# Patient Record
Sex: FEMALE | Race: WHITE | Employment: UNEMPLOYED | URBAN - METROPOLITAN AREA
[De-identification: names, ages, dates, MRNs, and addresses within clinical notes are randomized per-mention and may not be internally consistent; named-entity substitution may affect disease eponyms.]

---

## 2018-03-18 ENCOUNTER — HOSPITAL ENCOUNTER (EMERGENCY)
Age: 28
Discharge: HOME | End: 2018-03-18

## 2018-03-18 VITALS
WEIGHT: 147.31 LBS | OXYGEN SATURATION: 99 % | RESPIRATION RATE: 12 BRPM | DIASTOLIC BLOOD PRESSURE: 69 MMHG | HEART RATE: 100 BPM | BODY MASS INDEX: 27.11 KG/M2 | TEMPERATURE: 98.5 F | HEIGHT: 62 IN | SYSTOLIC BLOOD PRESSURE: 132 MMHG

## 2018-03-18 DIAGNOSIS — O21.9 VOMITING OF PREGNANCY, ANTEPARTUM: Primary | ICD-10-CM

## 2018-03-18 LAB — PREGNANCY TEST URINE, POC: POSITIVE

## 2018-03-18 PROCEDURE — 99284 EMERGENCY DEPT VISIT MOD MDM: CPT

## 2018-03-18 RX ORDER — LANOLIN ALCOHOL/MO/W.PET/CERES
25 CREAM (GRAM) TOPICAL 3 TIMES DAILY PRN
Qty: 30 TABLET | Refills: 3 | Status: ON HOLD | OUTPATIENT
Start: 2018-03-18 | End: 2018-08-23 | Stop reason: HOSPADM

## 2018-03-18 RX ORDER — SWAB
1 SWAB, NON-MEDICATED MISCELLANEOUS DAILY
Qty: 30 TABLET | Refills: 0 | Status: SHIPPED | OUTPATIENT
Start: 2018-03-18 | End: 2022-06-13 | Stop reason: HOSPADM

## 2018-03-18 NOTE — ED PROVIDER NOTES
R-3Print           Electronically signed by Rosa Elena Maldonado NP   DD: 3/18/18  **This report was transcribed using voice recognition software. Every effort was made to ensure accuracy; however, inadvertent computerized transcription errors may be present.   END OF ED PROVIDER NOTE     Rosa Elena Maldonado NP  03/18/18 2104

## 2018-08-17 ENCOUNTER — HOSPITAL ENCOUNTER (OUTPATIENT)
Age: 28
Discharge: HOME | DRG: 540 | End: 2018-08-17
Attending: OBSTETRICS & GYNECOLOGY | Admitting: OBSTETRICS & GYNECOLOGY
Payer: MEDICAID

## 2018-08-17 ENCOUNTER — APPOINTMENT (OUTPATIENT)
Dept: LABOR AND DELIVERY | Age: 28
DRG: 540 | End: 2018-08-17
Payer: MEDICAID

## 2018-08-17 VITALS
HEART RATE: 89 BPM | TEMPERATURE: 98.3 F | SYSTOLIC BLOOD PRESSURE: 139 MMHG | DIASTOLIC BLOOD PRESSURE: 73 MMHG | RESPIRATION RATE: 16 BRPM

## 2018-08-17 PROCEDURE — 6370000000 HC RX 637 (ALT 250 FOR IP): Performed by: OBSTETRICS & GYNECOLOGY

## 2018-08-17 PROCEDURE — 59025 FETAL NON-STRESS TEST: CPT

## 2018-08-17 RX ORDER — METRONIDAZOLE 500 MG/1
2000 TABLET ORAL ONCE
Status: COMPLETED | OUTPATIENT
Start: 2018-08-17 | End: 2018-08-17

## 2018-08-17 RX ADMIN — METRONIDAZOLE 2000 MG: 500 TABLET ORAL at 16:05

## 2018-08-20 ENCOUNTER — HOSPITAL ENCOUNTER (INPATIENT)
Age: 28
LOS: 3 days | Discharge: HOME | DRG: 540 | End: 2018-08-23
Attending: OBSTETRICS & GYNECOLOGY | Admitting: OBSTETRICS & GYNECOLOGY
Payer: MEDICAID

## 2018-08-20 ENCOUNTER — ANESTHESIA EVENT (OUTPATIENT)
Dept: LABOR AND DELIVERY | Age: 28
DRG: 540 | End: 2018-08-20
Payer: MEDICAID

## 2018-08-20 ENCOUNTER — ANESTHESIA (OUTPATIENT)
Dept: LABOR AND DELIVERY | Age: 28
DRG: 540 | End: 2018-08-20
Payer: MEDICAID

## 2018-08-20 DIAGNOSIS — G89.18 POSTOPERATIVE PAIN: Primary | ICD-10-CM

## 2018-08-20 PROBLEM — Z34.93 NORMAL PREGNANCY IN THIRD TRIMESTER: Status: ACTIVE | Noted: 2018-08-20

## 2018-08-20 LAB
ABO/RH: NORMAL
AMPHETAMINE SCREEN, URINE: NOT DETECTED
AMPHETAMINE SCREEN, URINE: NOT DETECTED
ANTIBODY SCREEN: NORMAL
BARBITURATE SCREEN URINE: NOT DETECTED
BARBITURATE SCREEN URINE: NOT DETECTED
BENZODIAZEPINE SCREEN, URINE: NOT DETECTED
BENZODIAZEPINE SCREEN, URINE: NOT DETECTED
CANNABINOID SCREEN URINE: NOT DETECTED
CANNABINOID SCREEN URINE: NOT DETECTED
COCAINE METABOLITE SCREEN URINE: NOT DETECTED
COCAINE METABOLITE SCREEN URINE: POSITIVE
HCT VFR BLD CALC: 37.3 % (ref 34–48)
HEMOGLOBIN: 12.7 G/DL (ref 11.5–15.5)
MCH RBC QN AUTO: 29.2 PG (ref 26–35)
MCHC RBC AUTO-ENTMCNC: 34 % (ref 32–34.5)
MCV RBC AUTO: 85.7 FL (ref 80–99.9)
METHADONE SCREEN, URINE: NOT DETECTED
METHADONE SCREEN, URINE: NOT DETECTED
OPIATE SCREEN URINE: NOT DETECTED
OPIATE SCREEN URINE: NOT DETECTED
PDW BLD-RTO: 12.6 FL (ref 11.5–15)
PHENCYCLIDINE SCREEN URINE: NOT DETECTED
PHENCYCLIDINE SCREEN URINE: NOT DETECTED
PLATELET # BLD: 198 E9/L (ref 130–450)
PMV BLD AUTO: 12.8 FL (ref 7–12)
PROPOXYPHENE SCREEN: NOT DETECTED
PROPOXYPHENE SCREEN: NOT DETECTED
RBC # BLD: 4.35 E12/L (ref 3.5–5.5)
WBC # BLD: 10.1 E9/L (ref 4.5–11.5)

## 2018-08-20 PROCEDURE — 3700000025 ANESTHESIA EPIDURAL BLOCK: Performed by: ANESTHESIOLOGY

## 2018-08-20 PROCEDURE — 2500000003 HC RX 250 WO HCPCS: Performed by: ANESTHESIOLOGY

## 2018-08-20 PROCEDURE — 86901 BLOOD TYPING SEROLOGIC RH(D): CPT

## 2018-08-20 PROCEDURE — 85027 COMPLETE CBC AUTOMATED: CPT

## 2018-08-20 PROCEDURE — 6360000002 HC RX W HCPCS

## 2018-08-20 PROCEDURE — 6360000002 HC RX W HCPCS: Performed by: OBSTETRICS & GYNECOLOGY

## 2018-08-20 PROCEDURE — 1220000001 HC SEMI PRIVATE L&D R&B

## 2018-08-20 PROCEDURE — 2580000003 HC RX 258: Performed by: OBSTETRICS & GYNECOLOGY

## 2018-08-20 PROCEDURE — 86900 BLOOD TYPING SEROLOGIC ABO: CPT

## 2018-08-20 PROCEDURE — 80307 DRUG TEST PRSMV CHEM ANLYZR: CPT

## 2018-08-20 PROCEDURE — G0480 DRUG TEST DEF 1-7 CLASSES: HCPCS

## 2018-08-20 PROCEDURE — 36415 COLL VENOUS BLD VENIPUNCTURE: CPT

## 2018-08-20 PROCEDURE — 86850 RBC ANTIBODY SCREEN: CPT

## 2018-08-20 RX ORDER — ONDANSETRON 2 MG/ML
4 INJECTION INTRAMUSCULAR; INTRAVENOUS EVERY 6 HOURS PRN
Status: DISCONTINUED | OUTPATIENT
Start: 2018-08-20 | End: 2018-08-21 | Stop reason: SDUPTHER

## 2018-08-20 RX ORDER — SODIUM CHLORIDE, SODIUM LACTATE, POTASSIUM CHLORIDE, CALCIUM CHLORIDE 600; 310; 30; 20 MG/100ML; MG/100ML; MG/100ML; MG/100ML
500 INJECTION, SOLUTION INTRAVENOUS
Status: ACTIVE | OUTPATIENT
Start: 2018-08-20 | End: 2018-08-20

## 2018-08-20 RX ORDER — SODIUM CHLORIDE, SODIUM LACTATE, POTASSIUM CHLORIDE, CALCIUM CHLORIDE 600; 310; 30; 20 MG/100ML; MG/100ML; MG/100ML; MG/100ML
INJECTION, SOLUTION INTRAVENOUS CONTINUOUS
Status: DISCONTINUED | OUTPATIENT
Start: 2018-08-20 | End: 2018-08-21 | Stop reason: SDUPTHER

## 2018-08-20 RX ORDER — SODIUM CHLORIDE 0.9 % (FLUSH) 0.9 %
10 SYRINGE (ML) INJECTION PRN
Status: DISCONTINUED | OUTPATIENT
Start: 2018-08-20 | End: 2018-08-21 | Stop reason: SDUPTHER

## 2018-08-20 RX ORDER — DOCUSATE SODIUM 100 MG/1
100 CAPSULE, LIQUID FILLED ORAL 2 TIMES DAILY
Status: DISCONTINUED | OUTPATIENT
Start: 2018-08-20 | End: 2018-08-21

## 2018-08-20 RX ORDER — TERBUTALINE SULFATE 1 MG/ML
0.25 INJECTION, SOLUTION SUBCUTANEOUS PRN
Status: DISCONTINUED | OUTPATIENT
Start: 2018-08-20 | End: 2018-08-21

## 2018-08-20 RX ORDER — NALOXONE HYDROCHLORIDE 0.4 MG/ML
0.4 INJECTION, SOLUTION INTRAMUSCULAR; INTRAVENOUS; SUBCUTANEOUS PRN
Status: DISCONTINUED | OUTPATIENT
Start: 2018-08-20 | End: 2018-08-21

## 2018-08-20 RX ORDER — NALBUPHINE HCL 10 MG/ML
5 AMPUL (ML) INJECTION EVERY 4 HOURS PRN
Status: DISCONTINUED | OUTPATIENT
Start: 2018-08-20 | End: 2018-08-21

## 2018-08-20 RX ORDER — ONDANSETRON 2 MG/ML
4 INJECTION INTRAMUSCULAR; INTRAVENOUS EVERY 6 HOURS PRN
Status: DISCONTINUED | OUTPATIENT
Start: 2018-08-20 | End: 2018-08-21

## 2018-08-20 RX ORDER — EPHEDRINE SULFATE/0.9% NACL/PF 50 MG/5 ML
10 SYRINGE (ML) INTRAVENOUS EVERY 5 MIN PRN
Status: DISCONTINUED | OUTPATIENT
Start: 2018-08-20 | End: 2018-08-21

## 2018-08-20 RX ADMIN — Medication 2 MILLI-UNITS/MIN: at 16:00

## 2018-08-20 RX ADMIN — Medication 15 ML: at 21:41

## 2018-08-20 RX ADMIN — Medication 15 ML/HR: at 21:52

## 2018-08-20 RX ADMIN — SODIUM CHLORIDE, POTASSIUM CHLORIDE, SODIUM LACTATE AND CALCIUM CHLORIDE: 600; 310; 30; 20 INJECTION, SOLUTION INTRAVENOUS at 20:29

## 2018-08-20 RX ADMIN — SODIUM CHLORIDE, POTASSIUM CHLORIDE, SODIUM LACTATE AND CALCIUM CHLORIDE: 600; 310; 30; 20 INJECTION, SOLUTION INTRAVENOUS at 15:15

## 2018-08-20 RX ADMIN — SODIUM CHLORIDE, POTASSIUM CHLORIDE, SODIUM LACTATE AND CALCIUM CHLORIDE: 600; 310; 30; 20 INJECTION, SOLUTION INTRAVENOUS at 22:54

## 2018-08-20 ASSESSMENT — LIFESTYLE VARIABLES: SMOKING_STATUS: 0

## 2018-08-20 ASSESSMENT — ENCOUNTER SYMPTOMS: SHORTNESS OF BREATH: 0

## 2018-08-20 NOTE — PROGRESS NOTES
Taking over care of patient at this time, report received from previous RN. RN to bedside where patient explained tonights POC, US/TOCO adjusted, pt up to void, and cervical exam completed. Patient verbalizes understanding. Patient perceives positive fetal movement and RN notes audible fetal movement over the monitor. RN monitoring and assessing FHT/UC pattern every 15 minutes while on IV pitocin. Call light in place and family at bedside.

## 2018-08-21 VITALS — TEMPERATURE: 96.8 F | SYSTOLIC BLOOD PRESSURE: 124 MMHG | DIASTOLIC BLOOD PRESSURE: 63 MMHG | OXYGEN SATURATION: 97 %

## 2018-08-21 PROCEDURE — 2580000003 HC RX 258: Performed by: NURSE ANESTHETIST, CERTIFIED REGISTERED

## 2018-08-21 PROCEDURE — 2500000003 HC RX 250 WO HCPCS

## 2018-08-21 PROCEDURE — 6370000000 HC RX 637 (ALT 250 FOR IP)

## 2018-08-21 PROCEDURE — 1220000001 HC SEMI PRIVATE L&D R&B

## 2018-08-21 PROCEDURE — 3700000001 HC ADD 15 MINUTES (ANESTHESIA): Performed by: OBSTETRICS & GYNECOLOGY

## 2018-08-21 PROCEDURE — 7100000001 HC PACU RECOVERY - ADDTL 15 MIN: Performed by: OBSTETRICS & GYNECOLOGY

## 2018-08-21 PROCEDURE — 6360000002 HC RX W HCPCS

## 2018-08-21 PROCEDURE — 6360000002 HC RX W HCPCS: Performed by: ANESTHESIOLOGY

## 2018-08-21 PROCEDURE — 2500000003 HC RX 250 WO HCPCS: Performed by: ANESTHESIOLOGY

## 2018-08-21 PROCEDURE — 2709999900 HC NON-CHARGEABLE SUPPLY: Performed by: OBSTETRICS & GYNECOLOGY

## 2018-08-21 PROCEDURE — 3700000000 HC ANESTHESIA ATTENDED CARE: Performed by: OBSTETRICS & GYNECOLOGY

## 2018-08-21 PROCEDURE — 2580000003 HC RX 258: Performed by: OBSTETRICS & GYNECOLOGY

## 2018-08-21 PROCEDURE — 3609079900 HC CESAREAN SECTION: Performed by: OBSTETRICS & GYNECOLOGY

## 2018-08-21 PROCEDURE — 6360000002 HC RX W HCPCS: Performed by: NURSE ANESTHETIST, CERTIFIED REGISTERED

## 2018-08-21 PROCEDURE — 2500000003 HC RX 250 WO HCPCS: Performed by: NURSE ANESTHETIST, CERTIFIED REGISTERED

## 2018-08-21 PROCEDURE — 7100000000 HC PACU RECOVERY - FIRST 15 MIN: Performed by: OBSTETRICS & GYNECOLOGY

## 2018-08-21 PROCEDURE — 6360000002 HC RX W HCPCS: Performed by: OBSTETRICS & GYNECOLOGY

## 2018-08-21 RX ORDER — SODIUM CHLORIDE, SODIUM LACTATE, POTASSIUM CHLORIDE, CALCIUM CHLORIDE 600; 310; 30; 20 MG/100ML; MG/100ML; MG/100ML; MG/100ML
INJECTION, SOLUTION INTRAVENOUS CONTINUOUS
Status: DISCONTINUED | OUTPATIENT
Start: 2018-08-21 | End: 2018-08-21

## 2018-08-21 RX ORDER — OXYCODONE HYDROCHLORIDE AND ACETAMINOPHEN 5; 325 MG/1; MG/1
2 TABLET ORAL EVERY 4 HOURS PRN
Status: DISCONTINUED | OUTPATIENT
Start: 2018-08-21 | End: 2018-08-23 | Stop reason: HOSPADM

## 2018-08-21 RX ORDER — BISACODYL 10 MG
10 SUPPOSITORY, RECTAL RECTAL DAILY PRN
Status: DISCONTINUED | OUTPATIENT
Start: 2018-08-21 | End: 2018-08-23 | Stop reason: HOSPADM

## 2018-08-21 RX ORDER — MAGNESIUM SULFATE IN WATER 40 MG/ML
INJECTION, SOLUTION INTRAVENOUS
Status: COMPLETED
Start: 2018-08-21 | End: 2018-08-21

## 2018-08-21 RX ORDER — BUPIVACAINE HYDROCHLORIDE 7.5 MG/ML
INJECTION, SOLUTION INTRASPINAL PRN
Status: DISCONTINUED | OUTPATIENT
Start: 2018-08-21 | End: 2018-08-21 | Stop reason: SDUPTHER

## 2018-08-21 RX ORDER — DOCUSATE SODIUM 100 MG/1
100 CAPSULE, LIQUID FILLED ORAL 2 TIMES DAILY
Status: DISCONTINUED | OUTPATIENT
Start: 2018-08-21 | End: 2018-08-23 | Stop reason: HOSPADM

## 2018-08-21 RX ORDER — SODIUM CHLORIDE, SODIUM LACTATE, POTASSIUM CHLORIDE, CALCIUM CHLORIDE 600; 310; 30; 20 MG/100ML; MG/100ML; MG/100ML; MG/100ML
INJECTION, SOLUTION INTRAVENOUS CONTINUOUS PRN
Status: DISCONTINUED | OUTPATIENT
Start: 2018-08-21 | End: 2018-08-21 | Stop reason: SDUPTHER

## 2018-08-21 RX ORDER — SODIUM CHLORIDE 0.9 % (FLUSH) 0.9 %
10 SYRINGE (ML) INJECTION EVERY 12 HOURS SCHEDULED
Status: DISCONTINUED | OUTPATIENT
Start: 2018-08-21 | End: 2018-08-23 | Stop reason: HOSPADM

## 2018-08-21 RX ORDER — OXYCODONE HYDROCHLORIDE AND ACETAMINOPHEN 5; 325 MG/1; MG/1
1 TABLET ORAL EVERY 4 HOURS PRN
Status: DISCONTINUED | OUTPATIENT
Start: 2018-08-21 | End: 2018-08-23 | Stop reason: HOSPADM

## 2018-08-21 RX ORDER — SIMETHICONE 80 MG
80 TABLET,CHEWABLE ORAL EVERY 6 HOURS PRN
Status: DISCONTINUED | OUTPATIENT
Start: 2018-08-21 | End: 2018-08-23 | Stop reason: HOSPADM

## 2018-08-21 RX ORDER — NALBUPHINE HCL 10 MG/ML
5 AMPUL (ML) INJECTION EVERY 4 HOURS PRN
Status: DISCONTINUED | OUTPATIENT
Start: 2018-08-21 | End: 2018-08-23 | Stop reason: HOSPADM

## 2018-08-21 RX ORDER — LANOLIN 100 %
OINTMENT (GRAM) TOPICAL
Status: DISCONTINUED | OUTPATIENT
Start: 2018-08-21 | End: 2018-08-23 | Stop reason: HOSPADM

## 2018-08-21 RX ORDER — LABETALOL HYDROCHLORIDE 5 MG/ML
INJECTION, SOLUTION INTRAVENOUS
Status: COMPLETED
Start: 2018-08-21 | End: 2018-08-21

## 2018-08-21 RX ORDER — IBUPROFEN 800 MG/1
800 TABLET ORAL EVERY 8 HOURS
Status: DISCONTINUED | OUTPATIENT
Start: 2018-08-21 | End: 2018-08-23 | Stop reason: HOSPADM

## 2018-08-21 RX ORDER — MORPHINE SULFATE 1 MG/ML
INJECTION, SOLUTION EPIDURAL; INTRATHECAL; INTRAVENOUS PRN
Status: DISCONTINUED | OUTPATIENT
Start: 2018-08-21 | End: 2018-08-21 | Stop reason: SDUPTHER

## 2018-08-21 RX ORDER — FERROUS SULFATE 325(65) MG
325 TABLET ORAL 2 TIMES DAILY WITH MEALS
Status: DISCONTINUED | OUTPATIENT
Start: 2018-08-21 | End: 2018-08-23 | Stop reason: HOSPADM

## 2018-08-21 RX ORDER — SODIUM CHLORIDE 0.9 % (FLUSH) 0.9 %
10 SYRINGE (ML) INJECTION EVERY 12 HOURS SCHEDULED
Status: DISCONTINUED | OUTPATIENT
Start: 2018-08-21 | End: 2018-08-21

## 2018-08-21 RX ORDER — SODIUM CHLORIDE 0.9 % (FLUSH) 0.9 %
10 SYRINGE (ML) INJECTION PRN
Status: DISCONTINUED | OUTPATIENT
Start: 2018-08-21 | End: 2018-08-23 | Stop reason: HOSPADM

## 2018-08-21 RX ORDER — ONDANSETRON 2 MG/ML
INJECTION INTRAMUSCULAR; INTRAVENOUS PRN
Status: DISCONTINUED | OUTPATIENT
Start: 2018-08-21 | End: 2018-08-21 | Stop reason: SDUPTHER

## 2018-08-21 RX ORDER — OXYCODONE HYDROCHLORIDE 5 MG/1
10 TABLET ORAL EVERY 4 HOURS PRN
Status: DISCONTINUED | OUTPATIENT
Start: 2018-08-21 | End: 2018-08-23 | Stop reason: HOSPADM

## 2018-08-21 RX ORDER — KETOROLAC TROMETHAMINE 15 MG/ML
15 INJECTION, SOLUTION INTRAMUSCULAR; INTRAVENOUS EVERY 6 HOURS
Status: COMPLETED | OUTPATIENT
Start: 2018-08-21 | End: 2018-08-22

## 2018-08-21 RX ORDER — METHYLERGONOVINE MALEATE 0.2 MG/ML
200 INJECTION INTRAVENOUS PRN
Status: DISCONTINUED | OUTPATIENT
Start: 2018-08-21 | End: 2018-08-23 | Stop reason: HOSPADM

## 2018-08-21 RX ORDER — SODIUM CHLORIDE, SODIUM LACTATE, POTASSIUM CHLORIDE, CALCIUM CHLORIDE 600; 310; 30; 20 MG/100ML; MG/100ML; MG/100ML; MG/100ML
INJECTION, SOLUTION INTRAVENOUS CONTINUOUS
Status: DISCONTINUED | OUTPATIENT
Start: 2018-08-21 | End: 2018-08-23 | Stop reason: HOSPADM

## 2018-08-21 RX ORDER — TRISODIUM CITRATE DIHYDRATE AND CITRIC ACID MONOHYDRATE 500; 334 MG/5ML; MG/5ML
SOLUTION ORAL
Status: COMPLETED
Start: 2018-08-21 | End: 2018-08-21

## 2018-08-21 RX ORDER — NALOXONE HYDROCHLORIDE 0.4 MG/ML
0.4 INJECTION, SOLUTION INTRAMUSCULAR; INTRAVENOUS; SUBCUTANEOUS PRN
Status: DISCONTINUED | OUTPATIENT
Start: 2018-08-21 | End: 2018-08-23 | Stop reason: HOSPADM

## 2018-08-21 RX ORDER — SODIUM CHLORIDE 0.9 % (FLUSH) 0.9 %
10 SYRINGE (ML) INJECTION PRN
Status: DISCONTINUED | OUTPATIENT
Start: 2018-08-21 | End: 2018-08-21

## 2018-08-21 RX ORDER — MAGNESIUM SULFATE IN WATER 40 MG/ML
4 INJECTION, SOLUTION INTRAVENOUS ONCE
Status: COMPLETED | OUTPATIENT
Start: 2018-08-21 | End: 2018-08-21

## 2018-08-21 RX ORDER — TRISODIUM CITRATE DIHYDRATE AND CITRIC ACID MONOHYDRATE 500; 334 MG/5ML; MG/5ML
30 SOLUTION ORAL ONCE
Status: COMPLETED | OUTPATIENT
Start: 2018-08-21 | End: 2018-08-21

## 2018-08-21 RX ORDER — OXYCODONE HYDROCHLORIDE 5 MG/1
5 TABLET ORAL EVERY 4 HOURS PRN
Status: DISCONTINUED | OUTPATIENT
Start: 2018-08-21 | End: 2018-08-23 | Stop reason: HOSPADM

## 2018-08-21 RX ORDER — ACETAMINOPHEN 325 MG/1
325 TABLET ORAL EVERY 4 HOURS PRN
Status: ACTIVE | OUTPATIENT
Start: 2018-08-21 | End: 2018-08-22

## 2018-08-21 RX ORDER — MEPERIDINE HYDROCHLORIDE 25 MG/ML
25 INJECTION INTRAMUSCULAR; INTRAVENOUS; SUBCUTANEOUS EVERY 4 HOURS PRN
Status: DISPENSED | OUTPATIENT
Start: 2018-08-21 | End: 2018-08-22

## 2018-08-21 RX ORDER — ONDANSETRON 2 MG/ML
4 INJECTION INTRAMUSCULAR; INTRAVENOUS EVERY 6 HOURS PRN
Status: DISCONTINUED | OUTPATIENT
Start: 2018-08-21 | End: 2018-08-23 | Stop reason: HOSPADM

## 2018-08-21 RX ADMIN — TRISODIUM CITRATE DIHYDRATE AND CITRIC ACID MONOHYDRATE 30 ML: 500; 334 SOLUTION ORAL at 16:53

## 2018-08-21 RX ADMIN — MAGNESIUM SULFATE HEPTAHYDRATE 2 G: 40 INJECTION, SOLUTION INTRAVENOUS at 20:28

## 2018-08-21 RX ADMIN — Medication 15 ML/HR: at 06:44

## 2018-08-21 RX ADMIN — SODIUM CHLORIDE, POTASSIUM CHLORIDE, SODIUM LACTATE AND CALCIUM CHLORIDE: 600; 310; 30; 20 INJECTION, SOLUTION INTRAVENOUS at 00:20

## 2018-08-21 RX ADMIN — Medication 15 ML/HR: at 15:24

## 2018-08-21 RX ADMIN — ONDANSETRON HYDROCHLORIDE 4 MG: 2 INJECTION, SOLUTION INTRAMUSCULAR; INTRAVENOUS at 17:37

## 2018-08-21 RX ADMIN — Medication 50 ML/HR: at 17:36

## 2018-08-21 RX ADMIN — MEPERIDINE HYDROCHLORIDE 25 MG: 25 INJECTION INTRAMUSCULAR; INTRAVENOUS; SUBCUTANEOUS at 21:48

## 2018-08-21 RX ADMIN — SODIUM CHLORIDE, POTASSIUM CHLORIDE, SODIUM LACTATE AND CALCIUM CHLORIDE: 600; 310; 30; 20 INJECTION, SOLUTION INTRAVENOUS at 17:10

## 2018-08-21 RX ADMIN — SODIUM CITRATE AND CITRIC ACID MONOHYDRATE 30 ML: 500; 334 SOLUTION ORAL at 16:53

## 2018-08-21 RX ADMIN — SODIUM CHLORIDE, POTASSIUM CHLORIDE, SODIUM LACTATE AND CALCIUM CHLORIDE: 600; 310; 30; 20 INJECTION, SOLUTION INTRAVENOUS at 16:54

## 2018-08-21 RX ADMIN — CEFOXITIN SODIUM 2 G: 2 INJECTION, SOLUTION INTRAVENOUS at 16:53

## 2018-08-21 RX ADMIN — Medication 999 MILLI-UNITS/MIN: at 17:38

## 2018-08-21 RX ADMIN — MORPHINE SULFATE 0.15 MG: 1 INJECTION, SOLUTION EPIDURAL; INTRATHECAL; INTRAVENOUS at 17:15

## 2018-08-21 RX ADMIN — KETOROLAC TROMETHAMINE 15 MG: 15 INJECTION, SOLUTION INTRAMUSCULAR; INTRAVENOUS at 19:38

## 2018-08-21 RX ADMIN — LABETALOL 20 MG/4 ML (5 MG/ML) INTRAVENOUS SYRINGE 20 MG: at 21:41

## 2018-08-21 RX ADMIN — SODIUM CHLORIDE, POTASSIUM CHLORIDE, SODIUM LACTATE AND CALCIUM CHLORIDE: 600; 310; 30; 20 INJECTION, SOLUTION INTRAVENOUS at 12:15

## 2018-08-21 RX ADMIN — SODIUM CHLORIDE, POTASSIUM CHLORIDE, SODIUM LACTATE AND CALCIUM CHLORIDE: 600; 310; 30; 20 INJECTION, SOLUTION INTRAVENOUS at 18:15

## 2018-08-21 RX ADMIN — MAGNESIUM SULFATE HEPTAHYDRATE 4 G: 40 INJECTION, SOLUTION INTRAVENOUS at 19:54

## 2018-08-21 RX ADMIN — BUPIVACAINE HYDROCHLORIDE IN DEXTROSE 2 ML: 7.5 INJECTION, SOLUTION SUBARACHNOID at 17:15

## 2018-08-21 RX ADMIN — MAGNESIUM SULFATE IN WATER 4 G: 40 INJECTION, SOLUTION INTRAVENOUS at 19:54

## 2018-08-21 ASSESSMENT — PULMONARY FUNCTION TESTS
PIF_VALUE: 0
PIF_VALUE: 1
PIF_VALUE: 0

## 2018-08-21 ASSESSMENT — PAIN SCALES - GENERAL
PAINLEVEL_OUTOF10: 3
PAINLEVEL_OUTOF10: 3
PAINLEVEL_OUTOF10: 5
PAINLEVEL_OUTOF10: 3

## 2018-08-21 NOTE — PROGRESS NOTES
Called Dr. Gurinder Flaherty, reported that pitocin is off due to late decelerations after the following interventions unsuccessful: LR bolus, repositioning and oxygen therapy; reported that patient received her epidural and is comfortable now. Cervical dilation unchanged from previous exam (fingertip). Orders received to keep pitocin off, if graph does not improve or worsens to call him back and prepare for  if necessary. RN to bedside where POC was discussed and verbalized understanding with patient and her family- pitocin stopped per order. Patient and family agreeable- rest encouraged. Call light in place.

## 2018-08-21 NOTE — PROGRESS NOTES
Patients position changed to modified semi fowlers with peanut call and TOCO/US adjusted and palpated again after having difficulty tracing contractions from EFM. Patient very comfortable with epidural. Call light in place as well as family.

## 2018-08-21 NOTE — PROGRESS NOTES
Called Dr. Beto Fair to report elevated blood pressures since admission, reported that patient is not doing well with pain and is requesting a  section. Reported pitocin rate, FHT/UC pattern that is difficult to trace due to pain. Dr. Beto Fair would like patient to receive epidural. RN to bedside to discuss epidural to continue with IOL. Patient and family agreeable. LR bolus initiated. At  RN still in room when deceleration occurring, patient turned to right and left, pitocin stopped and LR bolus running. US adjusted and RN remains at bedside until FHT's improved.

## 2018-08-21 NOTE — PROGRESS NOTES
Pitocin off and O2 via non rebreather placed on patient as intervention for late deceleration. Position changed again and LR bolus still infusing. RN remains at bedside.

## 2018-08-21 NOTE — OP NOTE
DATE OF PROCEDURE: 18     SURGEON: JULIENNE Hanna Bars:     PREOPERATIVE DIAGNOSIS: Arrest of cervical dilatation in 1st stage of labor    POSTOPERATIVE DIAGNOSIS:  Same due to persistent occipitoposterior position    OPERATION: Urgent primary low transverse  section. ANESTHESIA: Spinal.     ESTIMATED BLOOD LOSS: 632 ml    COMPLICATIONS: None    PROCEDURE: The patient was taken to the operating room where spinal anesthesia was started. It was tested and was found to be adequate. The patient was then prepared and draped in the normal sterile fashion in the dorsal supine position with a leftward tilt. A Pfannenstiel skin incision was then made with a scalpel and carried through to the underlying layer of fascia with the Bovie. The fascia was incised in the midline, and the incision was extended laterally with Mckinnon scissors. The superior aspect of the fascial incision was then grasped with Kocher clamps, elevated, and the underlying recti muscles dissected off bluntly. Attention was then turned to the inferior aspect of this incision which in a similar fashion was grasped, tented up with Kocher clamps, and the recti muscles dissected off bluntly. The recti muscles were then  in the midline, and the peritoneum was identified, tented up, and entered sharply with Metzenbaum scissors. The peritoneal incision was then extended superiorly and inferiorly with good visualization of the bladder. The bladder blade was then inserted and the vesicouterine peritoneum was identified, grasped with pickups, and entered sharply with Metzenbaum scissors. This incision was then extended laterally, and the bladder flap was created digitally. The bladder blade was then reinserted, and the lower uterine segment was incised in a transverse fashion with a scalpel. The uterine incision was then extended laterally with bandage scissors.  The bladder blade was removed, and the infant's head was delivered

## 2018-08-21 NOTE — PROGRESS NOTES
Dr. Kori Stringer called back with update on cervical exam. Phone call transferred to Nurse manager with orders received.

## 2018-08-21 NOTE — PROGRESS NOTES
Dr. Francisca Pompa called and updated that patient is having runs of deep variables. Pt does not have persistent lates or variables with every contraction. He will be in to see the patient shortly.

## 2018-08-21 NOTE — PROGRESS NOTES
RN at bedside at [address]. Pt tilted on her left side when I entered the room. Ultrasound adjusted and heart rate noted to be in the 80's.  Pt turned to far left, LR bolus started and pitocin discontinued at [facility]

## 2018-08-21 NOTE — PROGRESS NOTES
RN at bedside at 8217. Spontaneous resolution of deceleration. Pt then turned from left side to semi fowlers position at 0956 and vaginal exam completed. Pitocin off at 1001 for persistent deep variables.

## 2018-08-21 NOTE — PROGRESS NOTES
RN at bedside, position changes from right to left lateral, semi fowlers and with the use of peanut ball. LR bolus started.

## 2018-08-21 NOTE — ANESTHESIA PRE PROCEDURE
Department of Anesthesiology  Pre-Anesthesia Evaluation/Consultation    NAME:  Wade Brown                                         AGE: 29 y.o. MRN:    [id number]     Procedure (Scheduled):  Labor Options (spinal vs epidural vs GA)  Surgeon:  Dr. Kori Stringer     No Known Allergies  Patient Active Problem List   Diagnosis Code    Vomiting of pregnancy, antepartum O21.9    Normal pregnancy in third trimester Z34.93     History reviewed. No pertinent past medical history. Past Surgical History:   Procedure Laterality Date    MOUTH SURGERY  20007     Social History   Substance Use Topics    Smoking status: Current Every Day Smoker     Packs/day: 0.50     Years: 10.00     Types: Cigarettes    Smokeless tobacco: Never Used    Alcohol use No     Medications  No current facility-administered medications on file prior to encounter.       Current Outpatient Prescriptions on File Prior to Encounter   Medication Sig Dispense Refill    Prenatal Vit-Fe Fumarate-FA (PRENATAL VITAMIN) 28-0.8 MG TABS tablet Take 1 tablet by mouth daily 30 tablet 0    vitamin B-6 (PYRIDOXINE) 50 MG tablet Take 0.5 tablets by mouth 3 times daily as needed (nausea) 30 tablet 3     Current Facility-Administered Medications   Medication Dose Route Frequency Provider Last Rate Last Dose    lactated ringers infusion   Intravenous Continuous Mehrdad Fortune  mL/hr at 08/20/18 2029      sodium chloride flush 0.9 % injection 10 mL  10 mL Intravenous PRN Mehrdad Fortune MD        docusate sodium (COLACE) capsule 100 mg  100 mg Oral BID Mehrdad Fortune MD        ondansetron (ZOFRAN) injection 4 mg  4 mg Intravenous Q6H PRN Mehrdad Fortune MD        terbutaline (BRETHINE) injection 0.25 mg  0.25 mg Subcutaneous PRN Mehrdad Fortune MD        oxytocin (PITOCIN) 30 units in 500 mL infusion  1 nehal-units/min Intravenous Continuous Mehrdad Fortune MD 12 mL/hr at 08/20/18 1930 12 nehal-units/min at 08/20/18 1930    naloxone (NARCAN) injection 0.4 mg  0.4 mg Intravenous PRN 08/20/18 (!) 160/90   08/17/18 139/73   03/18/18 132/69       NPO Status:                                                                                 BMI:   Wt Readings from Last 3 Encounters:   03/18/18 147 lb 5 oz (66.8 kg)     There is no height or weight on file to calculate BMI.    CBC:   Lab Results   Component Value Date    WBC 10.1 08/20/2018    RBC 4.35 08/20/2018    HGB 12.7 08/20/2018    HCT 37.3 08/20/2018    MCV 85.7 08/20/2018    RDW 12.6 08/20/2018     08/20/2018       CMP: No results found for: NA, K, CL, CO2, BUN, CREATININE, GFRAA, AGRATIO, LABGLOM, GLUCOSE, PROT, CALCIUM, BILITOT, ALKPHOS, AST, ALT    POC Tests: No results for input(s): POCGLU, POCNA, POCK, POCCL, POCBUN, POCHEMO, POCHCT in the last 72 hours.     Coags: No results found for: PROTIME, INR, APTT    HCG (If Applicable):   Lab Results   Component Value Date    PREGTESTUR Positive 03/18/2018        ABGs: No results found for: PHART, PO2ART, SQY6AXS, OPK1YLS, BEART, C2UCHYRZ     Type & Screen (If Applicable):  No results found for: LABABO, [address]    Anesthesia Evaluation  Patient summary reviewed and Nursing notes reviewed no history of anesthetic complications:   Airway: Mallampati: II  TM distance: >3 FB   Neck ROM: full  Mouth opening: > = 3 FB Dental: normal exam         Pulmonary:Negative Pulmonary ROS and normal exam  breath sounds clear to auscultation      (-) pneumonia, COPD, asthma, shortness of breath, recent URI, sleep apnea and not a current smoker                           Cardiovascular:Negative CV ROS        (-) pacemaker, hypertension, valvular problems/murmurs, past MI, CAD, CABG/stent, dysrhythmias,  angina,  CHF, orthopnea, PND and  REARDON      Rhythm: regular  Rate: normal                    Neuro/Psych:   Negative Neuro/Psych ROS     (-) seizures, neuromuscular disease, TIA, CVA, headaches, psychiatric history and depression/anxiety            GI/Hepatic/Renal: Neg GI/Hepatic/Renal ROS       (-) hiatal

## 2018-08-21 NOTE — CARE COORDINATION
SW Note 8/21/2018 11:02 AM: SW received SW consult for \"positive UDS, late prenatal care\". Pt is positive for cocaine. SW contacted pt's nurse Florin Duval, relayed pt is in labor and has not had baby yet. SW will continue to follow for the delivery of baby and will meet with pt to discuss positive UDS and late prenatal care following delivery. SW will follow.  Electronically signed by CLAUDIO Chu on 8/21/2018 at 11:05 AM

## 2018-08-22 LAB
HCT VFR BLD CALC: 33.4 % (ref 34–48)
HEMOGLOBIN: 11.6 G/DL (ref 11.5–15.5)
MCH RBC QN AUTO: 29.1 PG (ref 26–35)
MCHC RBC AUTO-ENTMCNC: 34.7 % (ref 32–34.5)
MCV RBC AUTO: 83.7 FL (ref 80–99.9)
PDW BLD-RTO: 12.4 FL (ref 11.5–15)
PLATELET # BLD: 161 E9/L (ref 130–450)
PMV BLD AUTO: 12.2 FL (ref 7–12)
RBC # BLD: 3.99 E12/L (ref 3.5–5.5)
WBC # BLD: 15.3 E9/L (ref 4.5–11.5)

## 2018-08-22 PROCEDURE — 6360000002 HC RX W HCPCS: Performed by: OBSTETRICS & GYNECOLOGY

## 2018-08-22 PROCEDURE — 6360000002 HC RX W HCPCS: Performed by: ANESTHESIOLOGY

## 2018-08-22 PROCEDURE — 1220000001 HC SEMI PRIVATE L&D R&B

## 2018-08-22 PROCEDURE — 36415 COLL VENOUS BLD VENIPUNCTURE: CPT

## 2018-08-22 PROCEDURE — 85027 COMPLETE CBC AUTOMATED: CPT

## 2018-08-22 PROCEDURE — 2580000003 HC RX 258: Performed by: OBSTETRICS & GYNECOLOGY

## 2018-08-22 PROCEDURE — 6370000000 HC RX 637 (ALT 250 FOR IP): Performed by: OBSTETRICS & GYNECOLOGY

## 2018-08-22 RX ADMIN — DOCUSATE SODIUM 100 MG: 100 CAPSULE, LIQUID FILLED ORAL at 21:29

## 2018-08-22 RX ADMIN — MEPERIDINE HYDROCHLORIDE 25 MG: 25 INJECTION INTRAMUSCULAR; INTRAVENOUS; SUBCUTANEOUS at 02:51

## 2018-08-22 RX ADMIN — OXYCODONE HYDROCHLORIDE AND ACETAMINOPHEN 2 TABLET: 5; 325 TABLET ORAL at 11:33

## 2018-08-22 RX ADMIN — IBUPROFEN 800 MG: 800 TABLET ORAL at 19:01

## 2018-08-22 RX ADMIN — SODIUM CHLORIDE, POTASSIUM CHLORIDE, SODIUM LACTATE AND CALCIUM CHLORIDE: 600; 310; 30; 20 INJECTION, SOLUTION INTRAVENOUS at 18:22

## 2018-08-22 RX ADMIN — KETOROLAC TROMETHAMINE 15 MG: 15 INJECTION, SOLUTION INTRAMUSCULAR; INTRAVENOUS at 01:12

## 2018-08-22 RX ADMIN — OXYCODONE HYDROCHLORIDE AND ACETAMINOPHEN 2 TABLET: 5; 325 TABLET ORAL at 16:04

## 2018-08-22 RX ADMIN — MAGNESIUM SULFATE HEPTAHYDRATE 2 G/HR: 40 INJECTION, SOLUTION INTRAVENOUS at 08:12

## 2018-08-22 RX ADMIN — ENOXAPARIN SODIUM 40 MG: 40 INJECTION, SOLUTION INTRAVENOUS; SUBCUTANEOUS at 21:28

## 2018-08-22 RX ADMIN — MAGNESIUM SULFATE HEPTAHYDRATE 2 G/HR: 40 INJECTION, SOLUTION INTRAVENOUS at 18:17

## 2018-08-22 RX ADMIN — OXYCODONE HYDROCHLORIDE AND ACETAMINOPHEN 2 TABLET: 5; 325 TABLET ORAL at 20:10

## 2018-08-22 RX ADMIN — KETOROLAC TROMETHAMINE 15 MG: 15 INJECTION, SOLUTION INTRAMUSCULAR; INTRAVENOUS at 07:39

## 2018-08-22 RX ADMIN — SODIUM CHLORIDE, POTASSIUM CHLORIDE, SODIUM LACTATE AND CALCIUM CHLORIDE: 600; 310; 30; 20 INJECTION, SOLUTION INTRAVENOUS at 06:08

## 2018-08-22 ASSESSMENT — PAIN SCALES - GENERAL
PAINLEVEL_OUTOF10: 7
PAINLEVEL_OUTOF10: 8
PAINLEVEL_OUTOF10: 0
PAINLEVEL_OUTOF10: 5
PAINLEVEL_OUTOF10: 6
PAINLEVEL_OUTOF10: 8
PAINLEVEL_OUTOF10: 5
PAINLEVEL_OUTOF10: 3
PAINLEVEL_OUTOF10: 3
PAINLEVEL_OUTOF10: 9
PAINLEVEL_OUTOF10: 4

## 2018-08-22 ASSESSMENT — PAIN DESCRIPTION - PAIN TYPE: TYPE: ACUTE PAIN;SURGICAL PAIN

## 2018-08-22 ASSESSMENT — PAIN DESCRIPTION - FREQUENCY: FREQUENCY: INTERMITTENT

## 2018-08-22 ASSESSMENT — PAIN DESCRIPTION - DESCRIPTORS: DESCRIPTORS: ACHING;CRAMPING;DISCOMFORT

## 2018-08-22 ASSESSMENT — PAIN DESCRIPTION - LOCATION: LOCATION: ABDOMEN

## 2018-08-22 NOTE — PROGRESS NOTES
Electric breast pump ordered through [employer] [facility] for mom/baby separation.     Isacc Raygoza RN IBCLC CCCE
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
## 2018-08-22 NOTE — PROGRESS NOTES
Rn taking over care of pt for the 11-7 shift. Pt resting quietly in red on right side without complaints. Plan of care discussed and call light in reach.

## 2018-08-22 NOTE — PROGRESS NOTES
Assumed care of pt . Discussed plan of care for the shift. Pt verbalizes understanding without questions at this time. Pt with complaints of pain at this time, will medicate. Call light within reach.

## 2018-08-23 VITALS
SYSTOLIC BLOOD PRESSURE: 144 MMHG | OXYGEN SATURATION: 96 % | TEMPERATURE: 98.4 F | WEIGHT: 160 LBS | RESPIRATION RATE: 16 BRPM | HEIGHT: 61 IN | DIASTOLIC BLOOD PRESSURE: 86 MMHG | HEART RATE: 85 BPM | BODY MASS INDEX: 30.21 KG/M2

## 2018-08-23 PROCEDURE — 6370000000 HC RX 637 (ALT 250 FOR IP): Performed by: OBSTETRICS & GYNECOLOGY

## 2018-08-23 RX ORDER — IBUPROFEN 800 MG/1
800 TABLET ORAL EVERY 8 HOURS PRN
Qty: 20 TABLET | Refills: 1 | Status: SHIPPED | OUTPATIENT
Start: 2018-08-23 | End: 2021-09-14

## 2018-08-23 RX ORDER — OXYCODONE HYDROCHLORIDE 5 MG/1
5 TABLET ORAL EVERY 6 HOURS PRN
Qty: 20 TABLET | Refills: 0 | Status: SHIPPED | OUTPATIENT
Start: 2018-08-23 | End: 2018-08-30

## 2018-08-23 RX ADMIN — OXYCODONE HYDROCHLORIDE AND ACETAMINOPHEN 2 TABLET: 5; 325 TABLET ORAL at 00:36

## 2018-08-23 RX ADMIN — IBUPROFEN 800 MG: 800 TABLET ORAL at 11:34

## 2018-08-23 RX ADMIN — OXYCODONE HYDROCHLORIDE AND ACETAMINOPHEN 2 TABLET: 5; 325 TABLET ORAL at 10:16

## 2018-08-23 RX ADMIN — DOCUSATE SODIUM 100 MG: 100 CAPSULE, LIQUID FILLED ORAL at 09:45

## 2018-08-23 RX ADMIN — IBUPROFEN 800 MG: 800 TABLET ORAL at 03:04

## 2018-08-23 ASSESSMENT — PAIN SCALES - GENERAL
PAINLEVEL_OUTOF10: 7
PAINLEVEL_OUTOF10: 4
PAINLEVEL_OUTOF10: 7
PAINLEVEL_OUTOF10: 7

## 2018-08-23 ASSESSMENT — PAIN - FUNCTIONAL ASSESSMENT
PAIN_FUNCTIONAL_ASSESSMENT: 0-10
PAIN_FUNCTIONAL_ASSESSMENT: 0-10

## 2018-08-23 ASSESSMENT — PAIN DESCRIPTION - DESCRIPTORS: DESCRIPTORS: SORE

## 2018-08-23 NOTE — CARE COORDINATION
SS NOTE: CHARLEEN recd a vm from [facility] worker - [place] = [phone] ext [phone] this AM.  SW returned the call and left a vm for her. Awaiting her instructions for MOB. Rah Velasquez 8/23/2018 8:25 AM.8:25 AM

## 2018-08-23 NOTE — PROGRESS NOTES
Assumed care of pt. Discussed plan of care for the shift . Pt verbalizes understanding without questions at this time. Call light within reach.

## 2018-08-23 NOTE — PLAN OF CARE
Problem: Fluid Volume - Imbalance:  Goal: Absence of imbalanced fluid volume signs and symptoms  Absence of imbalanced fluid volume signs and symptoms   Outcome: Met This Shift    Goal: Absence of postpartum hemorrhage signs and symptoms  Absence of postpartum hemorrhage signs and symptoms   Outcome: Met This Shift      Problem: Pain - Acute:  Goal: Pain level will decrease  Pain level will decrease    Outcome: Met This Shift      Problem: Pain:  Goal: Control of acute pain  Control of acute pain   Outcome: Met This Shift    Goal: Control of chronic pain  Control of chronic pain   Outcome: Met This Shift    Goal: Pain level will decrease  Pain level will decrease    Outcome: Met This Shift

## 2018-08-23 NOTE — PROGRESS NOTES
Discharge instructions given to and explained to pt. Verbalizes understanding without questions at this time.

## 2018-08-25 LAB — COCAINE, CONFIRM, URINE: >1000 NG/ML

## 2018-09-26 NOTE — DISCHARGE SUMMARY
Obstetrical Discharge Form    Gestational Age:  44w3d    Antepartum complications: Intra-uterine growth restriction                                                  Drug use in pregnancy      Insufficient prenatal care    Date of Delivery:   2018    Type of Delivery:   arrest of cervical dilatation in 1st stage of labor    Delivered By:    Harsha Brandon M.D.             Baby:       Information for the patient's :   Natividad Castro [[id number]]        Anesthesia:    Spinal    Intrapartum complications: None    Feeding method:   breast    Postpartum complications: anemia    Discharge Date:   2018    Plan:   Follow up    in 1 week for incision check

## 2021-09-14 ENCOUNTER — APPOINTMENT (OUTPATIENT)
Dept: ULTRASOUND IMAGING | Age: 31
End: 2021-09-14
Payer: MEDICARE

## 2021-09-14 ENCOUNTER — HOSPITAL ENCOUNTER (OUTPATIENT)
Age: 31
Setting detail: OBSERVATION
Discharge: HOME | End: 2021-09-15
Attending: EMERGENCY MEDICINE | Admitting: OBSTETRICS & GYNECOLOGY
Payer: MEDICARE

## 2021-09-14 ENCOUNTER — HOSPITAL ENCOUNTER (EMERGENCY)
Age: 31
Discharge: HOME | End: 2021-09-14
Attending: EMERGENCY MEDICINE
Payer: MEDICARE

## 2021-09-14 VITALS
DIASTOLIC BLOOD PRESSURE: 94 MMHG | HEART RATE: 102 BPM | TEMPERATURE: 98.7 F | WEIGHT: 135 LBS | SYSTOLIC BLOOD PRESSURE: 130 MMHG | BODY MASS INDEX: 23.05 KG/M2 | RESPIRATION RATE: 16 BRPM | HEIGHT: 64 IN | OXYGEN SATURATION: 98 %

## 2021-09-14 DIAGNOSIS — O26.899 ABDOMINAL PAIN DURING PREGNANCY, ANTEPARTUM: Primary | ICD-10-CM

## 2021-09-14 DIAGNOSIS — F14.10 NONDEPENDENT COCAINE ABUSE (HCC): ICD-10-CM

## 2021-09-14 DIAGNOSIS — R10.9 ABDOMINAL PAIN DURING PREGNANCY, ANTEPARTUM: Primary | ICD-10-CM

## 2021-09-14 DIAGNOSIS — O03.9 INEVITABLE COMPLETE MISCARRIAGE WITHOUT COMPLICATION: Primary | ICD-10-CM

## 2021-09-14 DIAGNOSIS — O46.90 VAGINAL BLEEDING DURING PREGNANCY: ICD-10-CM

## 2021-09-14 DIAGNOSIS — Z71.1 CONCERN ABOUT MISCARRIAGE WITHOUT DIAGNOSIS: ICD-10-CM

## 2021-09-14 LAB
ALBUMIN SERPL-MCNC: 4.3 G/DL (ref 3.5–5.2)
ALP BLD-CCNC: 73 U/L (ref 35–104)
ALT SERPL-CCNC: 26 U/L (ref 0–32)
AMPHETAMINE SCREEN, URINE: NOT DETECTED
ANION GAP SERPL CALCULATED.3IONS-SCNC: 12 MMOL/L (ref 7–16)
AST SERPL-CCNC: 14 U/L (ref 0–31)
BACTERIA: ABNORMAL /HPF
BACTERIA: NORMAL /HPF
BARBITURATE SCREEN URINE: NOT DETECTED
BASOPHILS ABSOLUTE: 0.01 E9/L (ref 0–0.2)
BASOPHILS RELATIVE PERCENT: 0.1 % (ref 0–2)
BENZODIAZEPINE SCREEN, URINE: NOT DETECTED
BILIRUB SERPL-MCNC: 0.4 MG/DL (ref 0–1.2)
BILIRUBIN URINE: ABNORMAL
BILIRUBIN URINE: ABNORMAL
BLOOD, URINE: ABNORMAL
BLOOD, URINE: ABNORMAL
BUN BLDV-MCNC: 5 MG/DL (ref 6–20)
CALCIUM SERPL-MCNC: 9.1 MG/DL (ref 8.6–10.2)
CANNABINOID SCREEN URINE: NOT DETECTED
CHLORIDE BLD-SCNC: 99 MMOL/L (ref 98–107)
CLARITY: ABNORMAL
CLARITY: ABNORMAL
CO2: 24 MMOL/L (ref 22–29)
COCAINE METABOLITE SCREEN URINE: POSITIVE
COLOR: ABNORMAL
COLOR: ABNORMAL
CREAT SERPL-MCNC: 0.4 MG/DL (ref 0.5–1)
EOSINOPHILS ABSOLUTE: 0 E9/L (ref 0.05–0.5)
EOSINOPHILS RELATIVE PERCENT: 0 % (ref 0–6)
EPITHELIAL CELLS, UA: ABNORMAL /HPF
FENTANYL SCREEN, URINE: POSITIVE
GFR AFRICAN AMERICAN: >60
GFR NON-AFRICAN AMERICAN: >60 ML/MIN/1.73
GLUCOSE BLD-MCNC: 83 MG/DL (ref 74–99)
GLUCOSE URINE: 100 MG/DL
GLUCOSE URINE: NEGATIVE MG/DL
GONADOTROPIN, CHORIONIC (HCG) QUANT: ABNORMAL MIU/ML
HCG(URINE) PREGNANCY TEST: POSITIVE
HCT VFR BLD CALC: 34.5 % (ref 34–48)
HEMOGLOBIN: 12 G/DL (ref 11.5–15.5)
IMMATURE GRANULOCYTES #: 0.05 E9/L
IMMATURE GRANULOCYTES %: 0.4 % (ref 0–5)
KETONES, URINE: 15 MG/DL
KETONES, URINE: NEGATIVE MG/DL
LEUKOCYTE ESTERASE, URINE: ABNORMAL
LEUKOCYTE ESTERASE, URINE: NEGATIVE
LYMPHOCYTES ABSOLUTE: 2.99 E9/L (ref 1.5–4)
LYMPHOCYTES RELATIVE PERCENT: 23.2 % (ref 20–42)
Lab: ABNORMAL
MAGNESIUM: 1.8 MG/DL (ref 1.6–2.6)
MCH RBC QN AUTO: 28 PG (ref 26–35)
MCHC RBC AUTO-ENTMCNC: 34.8 % (ref 32–34.5)
MCV RBC AUTO: 80.6 FL (ref 80–99.9)
METHADONE SCREEN, URINE: NOT DETECTED
MONOCYTES ABSOLUTE: 0.64 E9/L (ref 0.1–0.95)
MONOCYTES RELATIVE PERCENT: 5 % (ref 2–12)
NEUTROPHILS ABSOLUTE: 9.22 E9/L (ref 1.8–7.3)
NEUTROPHILS RELATIVE PERCENT: 71.3 % (ref 43–80)
NITRITE, URINE: NEGATIVE
NITRITE, URINE: NEGATIVE
OPIATE SCREEN URINE: POSITIVE
OXYCODONE URINE: NOT DETECTED
PDW BLD-RTO: 12.8 FL (ref 11.5–15)
PH UA: 5.5 (ref 5–9)
PH UA: 6 (ref 5–9)
PHENCYCLIDINE SCREEN URINE: NOT DETECTED
PLATELET # BLD: 197 E9/L (ref 130–450)
PMV BLD AUTO: 11.7 FL (ref 7–12)
POTASSIUM REFLEX MAGNESIUM: 3 MMOL/L (ref 3.5–5)
PROTEIN UA: 100 MG/DL
PROTEIN UA: >=300 MG/DL
RBC # BLD: 4.28 E12/L (ref 3.5–5.5)
RBC UA: >20 /HPF (ref 0–2)
RBC UA: ABNORMAL /HPF (ref 0–2)
SARS-COV-2, NAAT: DETECTED
SODIUM BLD-SCNC: 135 MMOL/L (ref 132–146)
SPECIFIC GRAVITY UA: 1.02 (ref 1–1.03)
SPECIFIC GRAVITY UA: >=1.03 (ref 1–1.03)
TOTAL PROTEIN: 7.5 G/DL (ref 6.4–8.3)
UROBILINOGEN, URINE: 0.2 E.U./DL
UROBILINOGEN, URINE: 1 E.U./DL
WBC # BLD: 12.9 E9/L (ref 4.5–11.5)
WBC UA: ABNORMAL /HPF (ref 0–5)
WBC UA: NORMAL /HPF (ref 0–5)

## 2021-09-14 PROCEDURE — 84702 CHORIONIC GONADOTROPIN TEST: CPT

## 2021-09-14 PROCEDURE — 85025 COMPLETE CBC W/AUTO DIFF WBC: CPT

## 2021-09-14 PROCEDURE — 76817 TRANSVAGINAL US OBSTETRIC: CPT

## 2021-09-14 PROCEDURE — 80307 DRUG TEST PRSMV CHEM ANLYZR: CPT

## 2021-09-14 PROCEDURE — 81001 URINALYSIS AUTO W/SCOPE: CPT

## 2021-09-14 PROCEDURE — 36556 INSERT NON-TUNNEL CV CATH: CPT

## 2021-09-14 PROCEDURE — G0378 HOSPITAL OBSERVATION PER HR: HCPCS

## 2021-09-14 PROCEDURE — 96374 THER/PROPH/DIAG INJ IV PUSH: CPT

## 2021-09-14 PROCEDURE — 6360000002 HC RX W HCPCS: Performed by: EMERGENCY MEDICINE

## 2021-09-14 PROCEDURE — 83735 ASSAY OF MAGNESIUM: CPT

## 2021-09-14 PROCEDURE — 87635 SARS-COV-2 COVID-19 AMP PRB: CPT

## 2021-09-14 PROCEDURE — 36415 COLL VENOUS BLD VENIPUNCTURE: CPT

## 2021-09-14 PROCEDURE — 80053 COMPREHEN METABOLIC PANEL: CPT

## 2021-09-14 PROCEDURE — 2580000003 HC RX 258

## 2021-09-14 PROCEDURE — 81025 URINE PREGNANCY TEST: CPT

## 2021-09-14 PROCEDURE — 99284 EMERGENCY DEPT VISIT MOD MDM: CPT

## 2021-09-14 PROCEDURE — 96376 TX/PRO/DX INJ SAME DRUG ADON: CPT

## 2021-09-14 RX ORDER — FENTANYL CITRATE 50 UG/ML
25 INJECTION, SOLUTION INTRAMUSCULAR; INTRAVENOUS ONCE
Status: COMPLETED | OUTPATIENT
Start: 2021-09-14 | End: 2021-09-14

## 2021-09-14 RX ORDER — FENTANYL CITRATE 50 UG/ML
50 INJECTION, SOLUTION INTRAMUSCULAR; INTRAVENOUS ONCE
Status: COMPLETED | OUTPATIENT
Start: 2021-09-14 | End: 2021-09-14

## 2021-09-14 RX ORDER — SODIUM CHLORIDE 0.9 % (FLUSH) 0.9 %
SYRINGE (ML) INJECTION
Status: COMPLETED
Start: 2021-09-14 | End: 2021-09-14

## 2021-09-14 RX ADMIN — FENTANYL CITRATE 50 MCG: 0.05 INJECTION, SOLUTION INTRAMUSCULAR; INTRAVENOUS at 23:09

## 2021-09-14 RX ADMIN — SODIUM CHLORIDE, PRESERVATIVE FREE 10 ML: 5 INJECTION INTRAVENOUS at 22:11

## 2021-09-14 RX ADMIN — FENTANYL CITRATE 25 MCG: 0.05 INJECTION, SOLUTION INTRAMUSCULAR; INTRAVENOUS at 22:10

## 2021-09-14 ASSESSMENT — ENCOUNTER SYMPTOMS
VOMITING: 0
ABDOMINAL DISTENTION: 0
BACK PAIN: 0
SHORTNESS OF BREATH: 0
DIARRHEA: 0
WHEEZING: 0
SORE THROAT: 0
ABDOMINAL PAIN: 1
NAUSEA: 0
CHEST TIGHTNESS: 0
COUGH: 0

## 2021-09-14 ASSESSMENT — PAIN DESCRIPTION - ORIENTATION
ORIENTATION: LOWER
ORIENTATION: LOWER

## 2021-09-14 ASSESSMENT — PAIN DESCRIPTION - PROGRESSION: CLINICAL_PROGRESSION: NOT CHANGED

## 2021-09-14 ASSESSMENT — PAIN DESCRIPTION - PAIN TYPE: TYPE: ACUTE PAIN

## 2021-09-14 ASSESSMENT — PAIN DESCRIPTION - FREQUENCY
FREQUENCY: CONTINUOUS
FREQUENCY: INTERMITTENT

## 2021-09-14 ASSESSMENT — PAIN DESCRIPTION - LOCATION
LOCATION: ABDOMEN
LOCATION: ABDOMEN

## 2021-09-14 ASSESSMENT — PAIN DESCRIPTION - ONSET: ONSET: SUDDEN

## 2021-09-14 ASSESSMENT — PAIN SCALES - GENERAL
PAINLEVEL_OUTOF10: 8
PAINLEVEL_OUTOF10: 10
PAINLEVEL_OUTOF10: 10

## 2021-09-14 ASSESSMENT — PAIN DESCRIPTION - DESCRIPTORS
DESCRIPTORS: CONSTANT;CRAMPING
DESCRIPTORS: CRAMPING

## 2021-09-14 NOTE — ED PROVIDER NOTES
HPI:  21,   Time: 4:44 PM EDT         Giulia Arango is a 32 y.o. female presenting to the ED for abdominal cramping with painful menstrual cycle, beginning Monday ago. The complaint has been persistent, moderate in severity, and worsened by nothing. ROS:   Pertinent positives and negatives are stated within HPI, all other systems reviewed and are negative.  --------------------------------------------- PAST HISTORY ---------------------------------------------  Past Medical History:  has no past medical history on file. Past Surgical History:  has a past surgical history that includes Mouth surgery () and pr  delivery only (N/A, 2018). Social History:  reports that she has been smoking cigarettes. She has a 5.00 pack-year smoking history. She has never used smokeless tobacco. She reports current drug use. Drug: Marijuana. She reports that she does not drink alcohol. Family History: family history is not on file. The patients home medications have been reviewed. Allergies: Patient has no known allergies.     -------------------------------------------------- RESULTS -------------------------------------------------  All laboratory and radiology results have been personally reviewed by myself   LABS:  Results for orders placed or performed during the hospital encounter of 21   Urinalysis with Microscopic   Result Value Ref Range    Color, UA RED (A) Straw/Yellow    Clarity, UA TURBID (A) Clear    Glucose, Ur 100 (A) Negative mg/dL    Bilirubin Urine MODERATE (A) Negative    Ketones, Urine 15 (A) Negative mg/dL    Specific Gravity, UA >=1.030 1.005 - 1.030    Blood, Urine LARGE (A) Negative    pH, UA 6.0 5.0 - 9.0    Protein, UA >=300 (A) Negative mg/dL    Urobilinogen, Urine 1.0 <2.0 E.U./dL    Nitrite, Urine Negative Negative    Leukocyte Esterase, Urine TRACE (A) Negative    WBC, UA 1-3 0 - 5 /HPF    RBC, UA 5-10 (A) 0 - 2 /HPF    Epithelial Cells, UA RARE /HPF    Bacteria, UA RARE (A) None Seen /HPF   Pregnancy, urine   Result Value Ref Range    HCG(Urine) Pregnancy Test POSITIVE NEGATIVE       RADIOLOGY:  Interpreted by Radiologist.  No orders to display       ------------------------- NURSING NOTES AND VITALS REVIEWED ---------------------------   The nursing notes within the ED encounter and vital signs as below have been reviewed. BP (!) 130/94   Pulse 102   Temp 98.7 °F (37.1 °C) (Temporal)   Resp 16   Ht 5' 4\" (1.626 m)   Wt 135 lb (61.2 kg)   LMP 08/18/2021   SpO2 98%   BMI 23.17 kg/m²   Oxygen Saturation Interpretation: Normal      ---------------------------------------------------PHYSICAL EXAM--------------------------------------      Constitutional/General: Alert and oriented x3, well appearing, non toxic in NAD  Head: NC/AT  Eyes: PERRL, EOMI  Mouth: Oropharynx clear, handling secretions, no trismus  Neck: Supple, full ROM, no meningeal signs  Pulmonary: Lungs clear to auscultation bilaterally, no wheezes, rales, or rhonchi. Not in respiratory distress  Cardiovascular:  Regular rate and rhythm, no murmurs, gallops, or rubs. 2+ distal pulses  Abdomen: Soft, non tender, non distended,   Extremities: Moves all extremities x 4. Warm and well perfused  Skin: warm and dry without rash  Neurologic: GCS 15,  Psych: Normal Affect      ------------------------------ ED COURSE/MEDICAL DECISION MAKING----------------------  Medications - No data to display      Medical Decision Making:    Results explained to the patient. Due to the concern of possible miscarriage versus ectopic pregnancy, patient is directed to go directly to the emergency room. She will be transported there by her mother via private car. Dr. Julissa Pedroza was informed of possible arrival the patient. Counseling:    The emergency provider has spoken with the patient and discussed todays results, in addition to providing specific details for the plan of care and counseling

## 2021-09-14 NOTE — ED NOTES
IV access/ blood draw attempted x3 by this nurse. Patient states she is very dehydrated. Also has various  Venous scar tissue on both upper extremities.       Charisma Brooke RN  09/14/21 1955

## 2021-09-14 NOTE — ED NOTES
Pt instructed to go to [address] [place] [address] per Dr Kip Underwood for further testing due to preg and vaginal bleeding      Calpine Curia, RN  09/14/21 0387

## 2021-09-14 NOTE — ED TRIAGE NOTES
FIRST PROVIDER CONTACT ASSESSMENT NOTE                                                                                                Department of Emergency Medicine                                                      First Provider Note  21  6:55 PM EDT  NAME: Randeen Moritz  : 1990  MRN: [id number]    Chief Complaint: Abdominal Pain (beginning last night) and Vaginal Bleeding (beginning last night. Found out pregnant at urgent care (unknown time). )      History of Present Illness:   Randeen Moritz is a 32 y.o. female who presents to the ED for abdominal pain & vaginal bleeding which started last night. Patient states she was seen at urgent care and they advised her that her pregnancy test was positive and advised her to come to ED for further evaluation. Focused Physical Exam:  VS:    ED Triage Vitals [21 1802]   BP Temp Temp Source Pulse Resp SpO2 Height Weight   -- 97.2 °F (36.2 °C) Temporal 106 20 99 % -- --        General: Alert and in no apparent distress. Medical History:  has no past medical history on file. Surgical History:  has a past surgical history that includes Mouth surgery () and pr  delivery only (N/A, 2018). Social History:  reports that she has been smoking cigarettes. She has a 5.00 pack-year smoking history. She has never used smokeless tobacco. She reports current drug use. Drug: Marijuana. She reports that she does not drink alcohol. Family History: family history is not on file. Allergies: Patient has no known allergies.      Initial Plan of Care:  Initiate Treatment-Testing, Proceed toTreatment Area When Bed Available for ED Attending/MLP to Continue Care    -------------------------------------------------END OF FIRST PROVIDER CONTACT ASSESSMENT NOTE--------------------------------------------------------  Electronically signed by JOSEPH Herrera CNP   DD: 21

## 2021-09-14 NOTE — ED NOTES
Bed: 26  Expected date:   Expected time:   Means of arrival:   Comments:  EMT kiko Antonio RN  09/14/21 1924

## 2021-09-15 ENCOUNTER — ANESTHESIA (OUTPATIENT)
Dept: OPERATING ROOM | Age: 31
End: 2021-09-15
Payer: MEDICARE

## 2021-09-15 ENCOUNTER — ANESTHESIA EVENT (OUTPATIENT)
Dept: OPERATING ROOM | Age: 31
End: 2021-09-15
Payer: MEDICARE

## 2021-09-15 VITALS
DIASTOLIC BLOOD PRESSURE: 60 MMHG | OXYGEN SATURATION: 96 % | SYSTOLIC BLOOD PRESSURE: 117 MMHG | RESPIRATION RATE: 8 BRPM

## 2021-09-15 VITALS
SYSTOLIC BLOOD PRESSURE: 120 MMHG | HEART RATE: 70 BPM | DIASTOLIC BLOOD PRESSURE: 86 MMHG | HEIGHT: 64 IN | OXYGEN SATURATION: 99 % | WEIGHT: 135 LBS | RESPIRATION RATE: 16 BRPM | TEMPERATURE: 97.9 F | BODY MASS INDEX: 23.05 KG/M2

## 2021-09-15 PROBLEM — O03.4 INCOMPLETE ABORTION: Status: ACTIVE | Noted: 2021-09-14

## 2021-09-15 LAB
HCT VFR BLD CALC: 28.7 % (ref 34–48)
HEMOGLOBIN: 9.9 G/DL (ref 11.5–15.5)
MCH RBC QN AUTO: 27.9 PG (ref 26–35)
MCHC RBC AUTO-ENTMCNC: 34.5 % (ref 32–34.5)
MCV RBC AUTO: 80.8 FL (ref 80–99.9)
PDW BLD-RTO: 12.5 FL (ref 11.5–15)
PLATELET # BLD: 183 E9/L (ref 130–450)
PMV BLD AUTO: 12 FL (ref 7–12)
RBC # BLD: 3.55 E12/L (ref 3.5–5.5)
WBC # BLD: 8.4 E9/L (ref 4.5–11.5)

## 2021-09-15 PROCEDURE — 6370000000 HC RX 637 (ALT 250 FOR IP): Performed by: OBSTETRICS & GYNECOLOGY

## 2021-09-15 PROCEDURE — 6360000002 HC RX W HCPCS

## 2021-09-15 PROCEDURE — 85027 COMPLETE CBC AUTOMATED: CPT

## 2021-09-15 PROCEDURE — 36410 VNPNXR 3YR/> PHY/QHP DX/THER: CPT

## 2021-09-15 PROCEDURE — 2500000003 HC RX 250 WO HCPCS: Performed by: NURSE ANESTHETIST, CERTIFIED REGISTERED

## 2021-09-15 PROCEDURE — 6360000002 HC RX W HCPCS: Performed by: NURSE ANESTHETIST, CERTIFIED REGISTERED

## 2021-09-15 PROCEDURE — 3600000002 HC SURGERY LEVEL 2 BASE: Performed by: OBSTETRICS & GYNECOLOGY

## 2021-09-15 PROCEDURE — 7100000000 HC PACU RECOVERY - FIRST 15 MIN: Performed by: OBSTETRICS & GYNECOLOGY

## 2021-09-15 PROCEDURE — 7100000001 HC PACU RECOVERY - ADDTL 15 MIN: Performed by: OBSTETRICS & GYNECOLOGY

## 2021-09-15 PROCEDURE — 3600000012 HC SURGERY LEVEL 2 ADDTL 15MIN: Performed by: OBSTETRICS & GYNECOLOGY

## 2021-09-15 PROCEDURE — 2709999900 HC NON-CHARGEABLE SUPPLY: Performed by: OBSTETRICS & GYNECOLOGY

## 2021-09-15 PROCEDURE — 6360000002 HC RX W HCPCS: Performed by: OBSTETRICS & GYNECOLOGY

## 2021-09-15 PROCEDURE — G0378 HOSPITAL OBSERVATION PER HR: HCPCS

## 2021-09-15 PROCEDURE — 2580000003 HC RX 258: Performed by: NURSE ANESTHETIST, CERTIFIED REGISTERED

## 2021-09-15 PROCEDURE — 76937 US GUIDE VASCULAR ACCESS: CPT

## 2021-09-15 PROCEDURE — 6370000000 HC RX 637 (ALT 250 FOR IP)

## 2021-09-15 PROCEDURE — 88305 TISSUE EXAM BY PATHOLOGIST: CPT

## 2021-09-15 PROCEDURE — 3700000000 HC ANESTHESIA ATTENDED CARE: Performed by: OBSTETRICS & GYNECOLOGY

## 2021-09-15 PROCEDURE — 36415 COLL VENOUS BLD VENIPUNCTURE: CPT

## 2021-09-15 PROCEDURE — 3700000001 HC ADD 15 MINUTES (ANESTHESIA): Performed by: OBSTETRICS & GYNECOLOGY

## 2021-09-15 PROCEDURE — C1751 CATH, INF, PER/CENT/MIDLINE: HCPCS

## 2021-09-15 RX ORDER — SODIUM CHLORIDE 0.9 % (FLUSH) 0.9 %
5-40 SYRINGE (ML) INJECTION EVERY 12 HOURS SCHEDULED
Status: DISCONTINUED | OUTPATIENT
Start: 2021-09-15 | End: 2021-09-15 | Stop reason: SDUPTHER

## 2021-09-15 RX ORDER — SODIUM CHLORIDE, SODIUM LACTATE, POTASSIUM CHLORIDE, CALCIUM CHLORIDE 600; 310; 30; 20 MG/100ML; MG/100ML; MG/100ML; MG/100ML
INJECTION, SOLUTION INTRAVENOUS CONTINUOUS
Status: DISCONTINUED | OUTPATIENT
Start: 2021-09-15 | End: 2021-09-15 | Stop reason: HOSPADM

## 2021-09-15 RX ORDER — IBUPROFEN 600 MG/1
600 TABLET ORAL EVERY 6 HOURS PRN
Status: DISCONTINUED | OUTPATIENT
Start: 2021-09-15 | End: 2021-09-15 | Stop reason: HOSPADM

## 2021-09-15 RX ORDER — KETOROLAC TROMETHAMINE 30 MG/ML
30 INJECTION, SOLUTION INTRAMUSCULAR; INTRAVENOUS EVERY 6 HOURS
Status: DISCONTINUED | OUTPATIENT
Start: 2021-09-15 | End: 2021-09-15 | Stop reason: HOSPADM

## 2021-09-15 RX ORDER — SUCCINYLCHOLINE/SOD CL,ISO/PF 200MG/10ML
SYRINGE (ML) INTRAVENOUS PRN
Status: DISCONTINUED | OUTPATIENT
Start: 2021-09-15 | End: 2021-09-15 | Stop reason: SDUPTHER

## 2021-09-15 RX ORDER — PROPOFOL 10 MG/ML
INJECTION, EMULSION INTRAVENOUS PRN
Status: DISCONTINUED | OUTPATIENT
Start: 2021-09-15 | End: 2021-09-15 | Stop reason: SDUPTHER

## 2021-09-15 RX ORDER — HEPARIN SODIUM (PORCINE) LOCK FLUSH IV SOLN 100 UNIT/ML 100 UNIT/ML
1 SOLUTION INTRAVENOUS PRN
Status: DISCONTINUED | OUTPATIENT
Start: 2021-09-15 | End: 2021-09-15 | Stop reason: HOSPADM

## 2021-09-15 RX ORDER — OXYCODONE HYDROCHLORIDE AND ACETAMINOPHEN 5; 325 MG/1; MG/1
1 TABLET ORAL EVERY 4 HOURS PRN
Status: DISCONTINUED | OUTPATIENT
Start: 2021-09-15 | End: 2021-09-15 | Stop reason: HOSPADM

## 2021-09-15 RX ORDER — NALBUPHINE HCL 10 MG/ML
5 AMPUL (ML) INJECTION
Status: DISCONTINUED | OUTPATIENT
Start: 2021-09-15 | End: 2021-09-15 | Stop reason: HOSPADM

## 2021-09-15 RX ORDER — MISOPROSTOL 200 UG/1
TABLET ORAL
Status: COMPLETED
Start: 2021-09-15 | End: 2021-09-15

## 2021-09-15 RX ORDER — NEOSTIGMINE METHYLSULFATE 1 MG/ML
INJECTION, SOLUTION INTRAVENOUS PRN
Status: DISCONTINUED | OUTPATIENT
Start: 2021-09-15 | End: 2021-09-15 | Stop reason: SDUPTHER

## 2021-09-15 RX ORDER — LIDOCAINE HYDROCHLORIDE 20 MG/ML
INJECTION, SOLUTION INTRAVENOUS PRN
Status: DISCONTINUED | OUTPATIENT
Start: 2021-09-15 | End: 2021-09-15 | Stop reason: SDUPTHER

## 2021-09-15 RX ORDER — SODIUM CHLORIDE 9 MG/ML
25 INJECTION, SOLUTION INTRAVENOUS PRN
Status: DISCONTINUED | OUTPATIENT
Start: 2021-09-15 | End: 2021-09-15 | Stop reason: SDUPTHER

## 2021-09-15 RX ORDER — OXYCODONE HYDROCHLORIDE AND ACETAMINOPHEN 5; 325 MG/1; MG/1
2 TABLET ORAL EVERY 4 HOURS PRN
Status: DISCONTINUED | OUTPATIENT
Start: 2021-09-15 | End: 2021-09-15 | Stop reason: HOSPADM

## 2021-09-15 RX ORDER — IBUPROFEN 600 MG/1
600 TABLET ORAL 4 TIMES DAILY PRN
Qty: 60 TABLET | Refills: 0 | Status: SHIPPED | OUTPATIENT
Start: 2021-09-15 | End: 2022-06-13 | Stop reason: HOSPADM

## 2021-09-15 RX ORDER — DOXYCYCLINE HYCLATE 100 MG
100 TABLET ORAL 2 TIMES DAILY
Qty: 6 TABLET | Refills: 0 | Status: SHIPPED | OUTPATIENT
Start: 2021-09-15 | End: 2021-09-18

## 2021-09-15 RX ORDER — HEPARIN SODIUM (PORCINE) LOCK FLUSH IV SOLN 100 UNIT/ML 100 UNIT/ML
1 SOLUTION INTRAVENOUS EVERY 12 HOURS SCHEDULED
Status: DISCONTINUED | OUTPATIENT
Start: 2021-09-15 | End: 2021-09-15 | Stop reason: CLARIF

## 2021-09-15 RX ORDER — ONDANSETRON 4 MG/1
4 TABLET, ORALLY DISINTEGRATING ORAL EVERY 8 HOURS PRN
Status: DISCONTINUED | OUTPATIENT
Start: 2021-09-15 | End: 2021-09-15 | Stop reason: HOSPADM

## 2021-09-15 RX ORDER — FENTANYL CITRATE 50 UG/ML
INJECTION, SOLUTION INTRAMUSCULAR; INTRAVENOUS PRN
Status: DISCONTINUED | OUTPATIENT
Start: 2021-09-15 | End: 2021-09-15 | Stop reason: SDUPTHER

## 2021-09-15 RX ORDER — BUTORPHANOL TARTRATE 1 MG/ML
2 INJECTION, SOLUTION INTRAMUSCULAR; INTRAVENOUS
Status: DISCONTINUED | OUTPATIENT
Start: 2021-09-15 | End: 2021-09-15 | Stop reason: HOSPADM

## 2021-09-15 RX ORDER — SODIUM CHLORIDE, SODIUM LACTATE, POTASSIUM CHLORIDE, CALCIUM CHLORIDE 600; 310; 30; 20 MG/100ML; MG/100ML; MG/100ML; MG/100ML
INJECTION, SOLUTION INTRAVENOUS CONTINUOUS PRN
Status: DISCONTINUED | OUTPATIENT
Start: 2021-09-15 | End: 2021-09-15 | Stop reason: SDUPTHER

## 2021-09-15 RX ORDER — SODIUM CHLORIDE 0.9 % (FLUSH) 0.9 %
5-40 SYRINGE (ML) INJECTION EVERY 12 HOURS SCHEDULED
Status: DISCONTINUED | OUTPATIENT
Start: 2021-09-15 | End: 2021-09-15 | Stop reason: HOSPADM

## 2021-09-15 RX ORDER — MIDAZOLAM HYDROCHLORIDE 1 MG/ML
INJECTION INTRAMUSCULAR; INTRAVENOUS PRN
Status: DISCONTINUED | OUTPATIENT
Start: 2021-09-15 | End: 2021-09-15 | Stop reason: SDUPTHER

## 2021-09-15 RX ORDER — BUTORPHANOL TARTRATE 1 MG/ML
INJECTION, SOLUTION INTRAMUSCULAR; INTRAVENOUS
Status: COMPLETED
Start: 2021-09-15 | End: 2021-09-15

## 2021-09-15 RX ORDER — MISOPROSTOL 200 UG/1
800 TABLET ORAL
Status: DISCONTINUED | OUTPATIENT
Start: 2021-09-15 | End: 2021-09-15 | Stop reason: HOSPADM

## 2021-09-15 RX ORDER — ONDANSETRON 2 MG/ML
4 INJECTION INTRAMUSCULAR; INTRAVENOUS EVERY 6 HOURS PRN
Status: DISCONTINUED | OUTPATIENT
Start: 2021-09-15 | End: 2021-09-15 | Stop reason: HOSPADM

## 2021-09-15 RX ORDER — SODIUM CHLORIDE 9 MG/ML
25 INJECTION, SOLUTION INTRAVENOUS PRN
Status: DISCONTINUED | OUTPATIENT
Start: 2021-09-15 | End: 2021-09-15 | Stop reason: HOSPADM

## 2021-09-15 RX ORDER — ACETAMINOPHEN 325 MG/1
650 TABLET ORAL EVERY 4 HOURS PRN
Status: DISCONTINUED | OUTPATIENT
Start: 2021-09-15 | End: 2021-09-15 | Stop reason: SDUPTHER

## 2021-09-15 RX ORDER — ONDANSETRON 2 MG/ML
INJECTION INTRAMUSCULAR; INTRAVENOUS PRN
Status: DISCONTINUED | OUTPATIENT
Start: 2021-09-15 | End: 2021-09-15 | Stop reason: SDUPTHER

## 2021-09-15 RX ORDER — NALBUPHINE HCL 10 MG/ML
AMPUL (ML) INJECTION
Status: DISPENSED
Start: 2021-09-15 | End: 2021-09-15

## 2021-09-15 RX ORDER — ACETAMINOPHEN 325 MG/1
650 TABLET ORAL EVERY 4 HOURS PRN
Status: DISCONTINUED | OUTPATIENT
Start: 2021-09-15 | End: 2021-09-15 | Stop reason: HOSPADM

## 2021-09-15 RX ORDER — DEXAMETHASONE SODIUM PHOSPHATE 10 MG/ML
INJECTION, SOLUTION INTRAMUSCULAR; INTRAVENOUS PRN
Status: DISCONTINUED | OUTPATIENT
Start: 2021-09-15 | End: 2021-09-15 | Stop reason: SDUPTHER

## 2021-09-15 RX ORDER — SODIUM CHLORIDE 0.9 % (FLUSH) 0.9 %
5-40 SYRINGE (ML) INJECTION PRN
Status: DISCONTINUED | OUTPATIENT
Start: 2021-09-15 | End: 2021-09-15 | Stop reason: HOSPADM

## 2021-09-15 RX ORDER — HEPARIN SODIUM (PORCINE) LOCK FLUSH IV SOLN 100 UNIT/ML 100 UNIT/ML
1 SOLUTION INTRAVENOUS EVERY 12 HOURS SCHEDULED
Status: DISCONTINUED | OUTPATIENT
Start: 2021-09-15 | End: 2021-09-15 | Stop reason: HOSPADM

## 2021-09-15 RX ORDER — SODIUM CHLORIDE 0.9 % (FLUSH) 0.9 %
5-40 SYRINGE (ML) INJECTION PRN
Status: DISCONTINUED | OUTPATIENT
Start: 2021-09-15 | End: 2021-09-15 | Stop reason: SDUPTHER

## 2021-09-15 RX ORDER — GLYCOPYRROLATE 0.2 MG/ML
INJECTION INTRAMUSCULAR; INTRAVENOUS PRN
Status: DISCONTINUED | OUTPATIENT
Start: 2021-09-15 | End: 2021-09-15 | Stop reason: SDUPTHER

## 2021-09-15 RX ADMIN — BUTORPHANOL TARTRATE 2 MG: 1 INJECTION, SOLUTION INTRAMUSCULAR; INTRAVENOUS at 01:53

## 2021-09-15 RX ADMIN — BUTORPHANOL TARTRATE 2 MG: 1 INJECTION, SOLUTION INTRAMUSCULAR; INTRAVENOUS at 04:57

## 2021-09-15 RX ADMIN — MIDAZOLAM 2 MG: 1 INJECTION INTRAMUSCULAR; INTRAVENOUS at 18:16

## 2021-09-15 RX ADMIN — NEOSTIGMINE METHYLSULFATE 3 MG: 1 INJECTION, SOLUTION INTRAVENOUS at 18:45

## 2021-09-15 RX ADMIN — Medication 999 ML/HR: at 18:31

## 2021-09-15 RX ADMIN — LIDOCAINE HYDROCHLORIDE 100 MG: 20 INJECTION, SOLUTION INTRAVENOUS at 18:16

## 2021-09-15 RX ADMIN — SODIUM CHLORIDE, POTASSIUM CHLORIDE, SODIUM LACTATE AND CALCIUM CHLORIDE: 600; 310; 30; 20 INJECTION, SOLUTION INTRAVENOUS at 18:15

## 2021-09-15 RX ADMIN — Medication 160 MG: at 18:16

## 2021-09-15 RX ADMIN — DEXAMETHASONE SODIUM PHOSPHATE 10 MG: 10 INJECTION, SOLUTION INTRAMUSCULAR; INTRAVENOUS at 18:23

## 2021-09-15 RX ADMIN — MISOPROSTOL 800 MCG: 200 TABLET ORAL at 01:59

## 2021-09-15 RX ADMIN — FENTANYL CITRATE 100 MCG: 50 INJECTION, SOLUTION INTRAMUSCULAR; INTRAVENOUS at 18:16

## 2021-09-15 RX ADMIN — MISOPROSTOL 800 MCG: 200 TABLET ORAL at 14:04

## 2021-09-15 RX ADMIN — GLYCOPYRROLATE 0.6 MG: 0.2 INJECTION, SOLUTION INTRAMUSCULAR; INTRAVENOUS at 18:45

## 2021-09-15 RX ADMIN — PROPOFOL 200 MG: 10 INJECTION, EMULSION INTRAVENOUS at 18:17

## 2021-09-15 RX ADMIN — MISOPROSTOL 800 MCG: 200 TABLET ORAL at 05:59

## 2021-09-15 RX ADMIN — NALBUPHINE HYDROCHLORIDE 5 MG: 10 INJECTION, SOLUTION INTRAMUSCULAR; INTRAVENOUS; SUBCUTANEOUS at 14:04

## 2021-09-15 RX ADMIN — ONDANSETRON 4 MG: 2 INJECTION INTRAMUSCULAR; INTRAVENOUS at 18:23

## 2021-09-15 RX ADMIN — MISOPROSTOL 800 MCG: 200 TABLET ORAL at 10:27

## 2021-09-15 RX ADMIN — NALBUPHINE HYDROCHLORIDE 5 MG: 10 INJECTION, SOLUTION INTRAMUSCULAR; INTRAVENOUS; SUBCUTANEOUS at 10:28

## 2021-09-15 ASSESSMENT — PAIN SCALES - GENERAL
PAINLEVEL_OUTOF10: 0
PAINLEVEL_OUTOF10: 8
PAINLEVEL_OUTOF10: 0
PAINLEVEL_OUTOF10: 3
PAINLEVEL_OUTOF10: 0
PAINLEVEL_OUTOF10: 6
PAINLEVEL_OUTOF10: 5
PAINLEVEL_OUTOF10: 0
PAINLEVEL_OUTOF10: 0
PAINLEVEL_OUTOF10: 10

## 2021-09-15 ASSESSMENT — PULMONARY FUNCTION TESTS
PIF_VALUE: 0
PIF_VALUE: 0
PIF_VALUE: 3
PIF_VALUE: 0
PIF_VALUE: 16
PIF_VALUE: 0
PIF_VALUE: 16
PIF_VALUE: 0
PIF_VALUE: 0
PIF_VALUE: 2
PIF_VALUE: 15
PIF_VALUE: 1
PIF_VALUE: 16
PIF_VALUE: 1
PIF_VALUE: 0
PIF_VALUE: 0
PIF_VALUE: 16
PIF_VALUE: 0
PIF_VALUE: 16
PIF_VALUE: 0
PIF_VALUE: 0
PIF_VALUE: 16
PIF_VALUE: 1
PIF_VALUE: 16
PIF_VALUE: 15
PIF_VALUE: 16
PIF_VALUE: 0
PIF_VALUE: 16
PIF_VALUE: 2
PIF_VALUE: 16
PIF_VALUE: 16
PIF_VALUE: 0
PIF_VALUE: 16
PIF_VALUE: 15
PIF_VALUE: 0
PIF_VALUE: 15
PIF_VALUE: 15
PIF_VALUE: 0
PIF_VALUE: 2
PIF_VALUE: 15
PIF_VALUE: 0
PIF_VALUE: 0

## 2021-09-15 ASSESSMENT — LIFESTYLE VARIABLES: SMOKING_STATUS: 1

## 2021-09-15 NOTE — PROGRESS NOTES
Dr. Darrell Allen notified of anesthesia inability to establish an IV site with pt increasing complaints of pain. Orders received. Nursing supervisor notified of CRNA inability to establish an IV site. Hospitalist to be notified for possible assistance.

## 2021-09-15 NOTE — ED PROVIDER NOTES
Chief complaint:  Vaginal bleeding, pregnancy    HPI history provided by the patient  Patient comes in currently  2, para 1, aborta 0 last menstrual cycle was last month although was light and she states she is typically irregular so she does not think she missed anything. Developed some pelvic vaginal cramping and pain last night with bleeding and clot passing. States she found out she was pregnant today. Denies other abdominal pain. No back or flank pain. No hematuria dysuria. No fevers, sweats or chills. No vomiting or diarrhea. No treatment prior to arrival.  Nothing makes her better or worse. Review of Systems   Constitutional: Negative for chills, diaphoresis, fatigue and fever. HENT: Negative for congestion and sore throat. Respiratory: Negative for cough, chest tightness, shortness of breath and wheezing. Cardiovascular: Negative for chest pain, palpitations and leg swelling. Gastrointestinal: Positive for abdominal pain. Negative for abdominal distention, diarrhea, nausea and vomiting. Genitourinary: Positive for pelvic pain and vaginal bleeding. Negative for dysuria, flank pain and frequency. Musculoskeletal: Negative for arthralgias, back pain, gait problem, joint swelling, myalgias, neck pain and neck stiffness. Skin: Negative for rash and wound. Neurological: Negative for dizziness, seizures, syncope, weakness, light-headedness, numbness and headaches. Hematological: Negative for adenopathy. All other systems reviewed and are negative. Physical Exam  Vitals and nursing note reviewed. Constitutional:       General: She is not in acute distress. Appearance: She is well-developed. She is not ill-appearing, toxic-appearing or diaphoretic. HENT:      Head: Normocephalic and atraumatic. Eyes:      General: No scleral icterus. Pupils: Pupils are equal, round, and reactive to light.    Cardiovascular:      Rate and Rhythm: Normal rate and regular rhythm. Heart sounds: Normal heart sounds. No murmur heard. Pulmonary:      Effort: Pulmonary effort is normal. No respiratory distress. Breath sounds: Normal breath sounds. No stridor, decreased air movement or transmitted upper airway sounds. No decreased breath sounds, wheezing, rhonchi or rales. Chest:      Chest wall: No tenderness. Abdominal:      General: Bowel sounds are normal. There is no distension. Palpations: Abdomen is soft. Tenderness: There is no abdominal tenderness. There is no right CVA tenderness, left CVA tenderness, guarding or rebound. Comments: Abdomen soft with mild suprapubic discomfort on palpation. There is the abdomen is nontender. There is no guarding, rebound tenderness or rigidity. No CVA tenderness. Musculoskeletal:         General: No swelling, tenderness or signs of injury. Cervical back: Normal range of motion and neck supple. No signs of trauma or rigidity. No pain with movement, spinous process tenderness or muscular tenderness. Normal range of motion. Left lower leg: No edema. Comments: Arms legs are neurovascular intact with no pretibial edema or calf pain. Skin:     General: Skin is warm and dry. Coloration: Skin is not cyanotic, jaundiced, mottled or pale. Findings: No erythema or rash. Comments: Track marks throughout both upper extremities with no signs of abscess or cellulitis. Neurological:      General: No focal deficit present. Mental Status: She is alert and oriented to person, place, and time. GCS: GCS eye subscore is 4. GCS verbal subscore is 5. GCS motor subscore is 6. Cranial Nerves: No cranial nerve deficit. Coordination: Coordination normal.          Procedures   Venous Access Procedure Note  Indication: Need blood for laboratory evaluation and MD skill needed    Procedure:  The patient was placed in the appropriate position and the skin over the puncture site was prepped with alcohol. Intravenous access was obtained in a right forearm vein and the site was secured appropriately. Procedure was preformed under live ultrasound for guidance. The patient tolerated the procedure well. Complications: None        MDM     ED Course as of Sep 14 2325   Tue Sep 14, 2021   2253 Patient complaining increasing cramping and pain with contraction style pain with increased bleeding and clot passing. [NC]   7477 Pelvic exam at this time with nursing present, fairly moderate amount of bleeding with moderate clot formation in the vaginal vault, removed, cervix extremely posterior, retroflexed, unable to see cervical os or palpated. No gross other tissue then clots in the vaginal vault at this time. [NC]   1871 Case discussed with Dr. Shayy Santamaria,  OB/GYN on-call, detailed overview given, he will observe the patient overnight.    [NC]      ED Course User Index  [NC] [facility],         ED Course as of Sep 14 2325   Tue Sep 14, 2021   2253 Patient complaining increasing cramping and pain with contraction style pain with increased bleeding and clot passing. [NC]   0527 Pelvic exam at this time with nursing present, fairly moderate amount of bleeding with moderate clot formation in the vaginal vault, removed, cervix extremely posterior, retroflexed, unable to see cervical os or palpated. No gross other tissue then clots in the vaginal vault at this time. [NC]   7072 Case discussed with Dr. Shayy Santamaria,  OB/GYN on-call, detailed overview given, he will observe the patient overnight.    [NC]      ED Course User Index  [NC] [facility],        --------------------------------------------- PAST HISTORY ---------------------------------------------  Past Medical History:  has no past medical history on file. Past Surgical History:  has a past surgical history that includes Mouth surgery () and pr  delivery only (N/A, 2018).     Social History:  reports that she has been smoking cigarettes. She has a 5.00 pack-year smoking history. She has never used smokeless tobacco. She reports current drug use. Drug: Marijuana. She reports that she does not drink alcohol. Family History: family history is not on file. The patients home medications have been reviewed. Allergies: Patient has no known allergies.     -------------------------------------------------- RESULTS -------------------------------------------------    LABS:  Results for orders placed or performed during the hospital encounter of 09/14/21   CBC Auto Differential   Result Value Ref Range    WBC 12.9 (H) 4.5 - 11.5 E9/L    RBC 4.28 3.50 - 5.50 E12/L    Hemoglobin 12.0 11.5 - 15.5 g/dL    Hematocrit 34.5 34.0 - 48.0 %    MCV 80.6 80.0 - 99.9 fL    MCH 28.0 26.0 - 35.0 pg    MCHC 34.8 (H) 32.0 - 34.5 %    RDW 12.8 11.5 - 15.0 fL    Platelets 935 665 - 870 E9/L    MPV 11.7 7.0 - 12.0 fL    Neutrophils % 71.3 43.0 - 80.0 %    Immature Granulocytes % 0.4 0.0 - 5.0 %    Lymphocytes % 23.2 20.0 - 42.0 %    Monocytes % 5.0 2.0 - 12.0 %    Eosinophils % 0.0 0.0 - 6.0 %    Basophils % 0.1 0.0 - 2.0 %    Neutrophils Absolute 9.22 (H) 1.80 - 7.30 E9/L    Immature Granulocytes # 0.05 E9/L    Lymphocytes Absolute 2.99 1.50 - 4.00 E9/L    Monocytes Absolute 0.64 0.10 - 0.95 E9/L    Eosinophils Absolute 0.00 (L) 0.05 - 0.50 E9/L    Basophils Absolute 0.01 0.00 - 0.20 E9/L   Comprehensive Metabolic Panel w/ Reflex to MG   Result Value Ref Range    Sodium 135 132 - 146 mmol/L    Potassium reflex Magnesium 3.0 (L) 3.5 - 5.0 mmol/L    Chloride 99 98 - 107 mmol/L    CO2 24 22 - 29 mmol/L    Anion Gap 12 7 - 16 mmol/L    Glucose 83 74 - 99 mg/dL    BUN 5 (L) 6 - 20 mg/dL    CREATININE 0.4 (L) 0.5 - 1.0 mg/dL    GFR Non-African American >60 >=60 mL/min/1.73    GFR African American >60     Calcium 9.1 8.6 - 10.2 mg/dL    Total Protein 7.5 6.4 - 8.3 g/dL    Albumin 4.3 3.5 - 5.2 g/dL    Total Bilirubin 0.4 0.0 - 1.2 mg/dL Alkaline Phosphatase 73 35 - 104 U/L    ALT 26 0 - 32 U/L    AST 14 0 - 31 U/L   HCG, Quantitative, Pregnancy   Result Value Ref Range    hCG Quant 93881.0 (H) <10 mIU/mL   Urine Drug Screen   Result Value Ref Range    Amphetamine Screen, Urine NOT DETECTED Negative <1000 ng/mL    Barbiturate Screen, Ur NOT DETECTED Negative < 200 ng/mL    Benzodiazepine Screen, Urine NOT DETECTED Negative < 200 ng/mL    Cannabinoid Scrn, Ur NOT DETECTED Negative < 50ng/mL    Cocaine Metabolite Screen, Urine POSITIVE (A) Negative < 300 ng/mL    Opiate Scrn, Ur POSITIVE (A) Negative < 300ng/mL    PCP Screen, Urine NOT DETECTED Negative < 25 ng/mL    Methadone Screen, Urine NOT DETECTED Negative <300 ng/mL    Oxycodone Urine NOT DETECTED Negative <100 ng/mL    FENTANYL SCREEN, URINE POSITIVE (A) Negative <1 ng/mL    Drug Screen Comment: see below    Urinalysis   Result Value Ref Range    Color, UA ABBY (A) Straw/Yellow    Clarity, UA CLOUDY (A) Clear    Glucose, Ur Negative Negative mg/dL    Bilirubin Urine SMALL (A) Negative    Ketones, Urine Negative Negative mg/dL    Specific Gravity, UA 1.025 1.005 - 1.030    Blood, Urine LARGE (A) Negative    pH, UA 5.5 5.0 - 9.0    Protein,  (A) Negative mg/dL    Urobilinogen, Urine 0.2 <2.0 E.U./dL    Nitrite, Urine Negative Negative    Leukocyte Esterase, Urine Negative Negative   Microscopic Urinalysis   Result Value Ref Range    WBC, UA 1-3 0 - 5 /HPF    RBC, UA >20 0 - 2 /HPF    Bacteria, UA NONE SEEN None Seen /HPF   Magnesium   Result Value Ref Range    Magnesium 1.8 1.6 - 2.6 mg/dL       RADIOLOGY:  US OB TRANSVAGINAL   Final Result   1. There is an irregularly shaped intrauterine gestational sac in the lower   uterine segment of the uterus. Fetal pole identified which measured 2.86 cm. Fetal cardiac activity not detected. 2.  Moderate-sized subchorionic hemorrhage. 3.  Normal appearance of the left ovary. Nonvisualization of the right   ovary.   There are no adnexal masses. These results were called by Dr. Nemo Key to Dr. Shiloh Estrada On 9/14/2021 at   21:36.                 ------------------------- NURSING NOTES AND VITALS REVIEWED ---------------------------  Date / Time Roomed:  9/14/2021  7:24 PM  ED Bed Assignment:  26/26    The nursing notes within the ED encounter and vital signs as below have been reviewed. Patient Vitals for the past 24 hrs:   BP Temp Temp src Pulse Resp SpO2 Height Weight   09/14/21 1856 (!) 141/79 -- -- 95 20 99 % 5' 4\" (1.626 m) 135 lb (61.2 kg)   09/14/21 1802 -- 97.2 °F (36.2 °C) Temporal 106 20 99 % -- --       Oxygen Saturation Interpretation: Normal    --------------------------------------    Counseling:  I have spoken with the patient and discussed todays results, in addition to providing specific details for the plan of care and counseling regarding the diagnosis and prognosis. Their questions are answered at this time and they are agreeable with the plan of admission.    -------------  This patient's ED course included: a personal history and physicial examination, re-evaluation prior to disposition, multiple bedside re-evaluations and IV medications    This patient has remained hemodynamically stable during their ED course. Diagnosis:  1. Inevitable complete miscarriage without complication    2. Nondependent cocaine abuse ([facility] [address]        Disposition:  Patient's disposition: Admit to med/surg floor/L&D  Patient's condition is stable.          Renée Garcia DO  09/14/21 9783

## 2021-09-15 NOTE — PROCEDURES
SL power midline Placement 9/15/2021    Product number: [id number]   Lot Number: [id number]   Consult: limited access   Ultrasound: yes   Left Brachial vein:                Upper Arm Circumference: 25cm    Size: 4.5 fr    Exposed Length: 0    Internal Length: 15cm   Cut: 0   Vein Measurement: 0.50cm    Brisk blood return noted, flushed well after lab drawn. Capped. Tolerated well.  RN given lab draw and notified    Al Encinas RN  9/15/2021  10:25 AM

## 2021-09-15 NOTE — PROGRESS NOTES
notified that we still do not have IV access, that the patients bleeding has slowed down and that her pain level is now a 3. Orders to remain the same and to continue with cytotec every 4 hours.

## 2021-09-15 NOTE — ED NOTES
ED provider aware patient has no IV access and unable to collect labs.       Fernanda Jolley RN  09/14/21 2262

## 2021-09-15 NOTE — PROGRESS NOTES
Resting quietly Denies pain when asked Small amount sang. Drainage noted on joselyn pad.  Report called to RN L&D

## 2021-09-15 NOTE — H&P
Department of Gynecology  H&P Note          CHIEF COMPLAINT:   Incomplete     History obtained from patient, electronic medical record    HISTORY OF PRESENT ILLNESS:     The patient is a 32 y.o. female with significant past medical history of IV drug abuse who presents to ER with severe vaginal bleeding. Patient was evaluated in the emergency room and an ultrasound was performed confirming gestational age of about 9 weeks with a fetal pole with no fetal cardiac activity noted. Due to poor intravenous access and inability of even the IV team to place an IV, Cytotec was given rectally until we were able to place a central line. Past Medical History:    History reviewed. No pertinent past medical history. Past Surgical History:        Procedure Laterality Date    MOUTH SURGERY      [place]  DELIVERY ONLY N/A 2018     SECTION performed by Kathy Simmons MD at [facility] L&D OR       Past Gynecological History:    1. Last menstrual period: 9 weeks ago  2. Menses: interval:  4 weeks  duration:  5 day(s)  3. Contraception: None  4. Sexually transmitted disease history: Multiple but cannot recall which        A.  Number of sexual partners in the last 6 months: Multiple    meds:  Current Facility-Administered Medications:     acetaminophen (TYLENOL) tablet 650 mg, 650 mg, Oral, Q4H PRN, Tena Doe MD    ondansetron (ZOFRAN-ODT) disintegrating tablet 4 mg, 4 mg, Oral, Q8H PRN **OR** ondansetron (ZOFRAN) injection 4 mg, 4 mg, IntraVENous, Q6H PRN, Tena Doe MD    nalbuphine (NUBAIN) injection 5 mg, 5 mg, IntraVENous, Q3H PRN, Tena Doe MD, 5 mg at 09/15/21 1404    lactated ringers infusion, , IntraVENous, Continuous, Tena Doe MD    butorphanol (STADOL) injection 2 mg, 2 mg, IntraMUSCular, Q3H PRN, Tena Doe MD, 2 mg at 09/15/21 9877    miSOPROStol (CYTOTEC) tablet 800 mcg, 800 mcg, Rectal, 6 times per day, Tena Doe MD, 800 mcg at 09/15/21 1404    lidocaine 1 % injection 5 mL, 5 mL, IntraDERmal, Once, Tena Doe MD    sodium chloride flush 0.9 % injection 5-40 mL, 5-40 mL, IntraVENous, 2 times per day, Tena Doe MD    sodium chloride flush 0.9 % injection 5-40 mL, 5-40 mL, IntraVENous, PRN, Tena Doe MD    0.9 % sodium chloride infusion, 25 mL, IntraVENous, PRN, Tena Doe MD    heparin flush 100 UNIT/ML injection 100 Units, 1 mL, IntraCATHeter, PRN, Tena Doe MD    heparin flush 100 UNIT/ML injection 100 Units, 1 mL, IntraVENous, 2 times per day, Tena Doe MD       Allergies:  Patient has no known allergies. Social History:  TOBACCO:   reports that she has been smoking cigarettes. She has a 5.00 pack-year smoking history. She has never used smokeless tobacco.  ETOH:   reports no history of alcohol use. DRUGS:   reports current drug use. Drugs: Marijuana, Cocaine, and IV. Family History:   History reviewed. No pertinent family history.     Review of Systems  Review of Systems -  General ROS: negative for - chills, fatigue or malaise  ENT ROS: negative for - hearing change, nasal congestion or nasal discharge  Allergy and Immunology ROS: negative for - hives, itchy/watery eyes or nasal congestion  Hematological and Lymphatic ROS: negative for - blood clots, blood transfusions, bruising or fatigue  Endocrine ROS: negative for - malaise/lethargy, mood swings, palpitations or polydipsia/polyuria  Breast ROS: negative for - new or changing breast lumps or nipple changes  Respiratory ROS: negative for - sputum changes, stridor, tachypnea or wheezing  Cardiovascular ROS: negative for - irregular heartbeat, loss of consciousness, murmur or orthopnea  Gastrointestinal ROS: negative for - constipation, diarrhea, gas/bloating, heartburn or hematemesis  Genito-Urinary ROS: negative for -  genital discharge, genital ulcers or hematuria  Musculoskeletal ROS: negative for - gait disturbance, muscle pain or muscular weakness       PHYSICAL EXAM:    Vitals:  /65   Pulse 86   Temp 98.6 °F (37 °C)   Resp 18   Ht 5' 4\" (1.626 m)   Wt 135 lb (61.2 kg)   LMP  (LMP Unknown)   SpO2 99%   BMI 23.17 kg/m²     General appearance:  NAD  Pyscho/social: neg for tremors and hallucinations  Head: NCAT, PERRLA, EOMI, red conjunctiva  Neck: supple, no masses  Lungs: CTAB, equal chest rise bilateral  Heart: Reg rate  Abdomen: soft, moderately tender in RUQ, nondistended  Skin; no lesions  Gu: no cva tenderness  Extremities: extremities normal, atraumatic, no cyanosis or edema    External Genitalia: General appearance; normal, Hair distribution; normal, Lesions absent  Urethral Meatus: Size normal, Location normal, Lesions absent, Prolapse absent  Vagina: General appearance normal, Estrogen effect normal, Discharge absent, Lesions absent, Pelvic support normal  Cervix: General appearance normal, Lesions absent, Discharge absent, Tenderness absent, Enlargement absent, Nodularity absent  Uterus: 10 weeks size  Adenexa: Masses absent, Tenderness absent    DATA:  Labs:    CBC:   Lab Results   Component Value Date    WBC 8.4 09/15/2021    RBC 3.55 09/15/2021    HGB 9.9 09/15/2021    HCT 28.7 09/15/2021    MCV 80.8 09/15/2021    RDW 12.5 09/15/2021     09/15/2021       IMPRESSION/RECOMMENDATIONS:      Active Problems:  Incomplete AB  Positive Covid  IV drug abuse  Positive cocaine    Plan: Suction D&C      The patient was counseled at length about the risks of bianka Covid-19 during their perioperative period and any recovery window from their procedure. The patient was made aware that bianka Covid-19  may worsen their prognosis for recovering from their procedure  and lend to a higher morbidity and/or mortality risk. All material risks, benefits, and reasonable alternatives including postponing the procedure were discussed. The patient does wish to proceed with the procedure at this time.

## 2021-09-15 NOTE — ANESTHESIA PRE PROCEDURE
Department of Anesthesiology  Preprocedure Note       Name:  Nakia Grace   Age:  32 y.o.  :  1990                                          MRN:  [id number]         Date:  9/15/2021      Surgeon: Aminata Nevarez):  Siva Robles MD    Procedure: Procedure(s):  DILATATION AND CURETTAGE SUCTION    Medications prior to admission:   Prior to Admission medications    Medication Sig Start Date End Date Taking?  Authorizing Provider   Prenatal Vit-Fe Fumarate-FA (PRENATAL VITAMIN) 28-0.8 MG TABS tablet Take 1 tablet by mouth daily 3/18/18   JOSEPH Woodard - CNP       Current medications:    Current Facility-Administered Medications   Medication Dose Route Frequency Provider Last Rate Last Admin    acetaminophen (TYLENOL) tablet 650 mg  650 mg Oral Q4H PRN Tena Doe MD        ondansetron (ZOFRAN-ODT) disintegrating tablet 4 mg  4 mg Oral Q8H PRN Tena Doe MD        Or    ondansetron (ZOFRAN) injection 4 mg  4 mg IntraVENous Q6H PRN Tena Doe MD        nalbuphine (NUBAIN) injection 5 mg  5 mg IntraVENous Q3H PRN Tena Doe MD   5 mg at 09/15/21 1404    lactated ringers infusion   IntraVENous Continuous Tena Doe MD        butorphanol (STADOL) injection 2 mg  2 mg IntraMUSCular Q3H PRN Tena Doe MD   2 mg at 09/15/21 0457    miSOPROStol (CYTOTEC) tablet 800 mcg  800 mcg Rectal 6 times per day Tena Doe MD   800 mcg at 09/15/21 1404    lidocaine 1 % injection 5 mL  5 mL IntraDERmal Once Tena Doe MD        sodium chloride flush 0.9 % injection 5-40 mL  5-40 mL IntraVENous 2 times per day Tena Doe MD        sodium chloride flush 0.9 % injection 5-40 mL  5-40 mL IntraVENous PRN Tena Doe MD        0.9 % sodium chloride infusion  25 mL IntraVENous PRN Tena Doe MD        heparin flush 100 UNIT/ML injection 100 Units  1 mL IntraCATHeter PRN Tena Doe MD        heparin flush 100 UNIT/ML injection 100 Units  1 mL IntraVENous 2 times per day Tena Doe MD           Allergies:  No Known Allergies    Problem List:    Patient Active Problem List   Diagnosis Code    Vomiting of pregnancy, antepartum O21.9    Normal pregnancy in third trimester Z34.93    Miscarriage O03.9       Past Medical History:  History reviewed. No pertinent past medical history. Past Surgical History:        Procedure Laterality Date    MOUTH SURGERY      [place]  DELIVERY ONLY N/A 2018     SECTION performed by Kat Lopez MD at [facility] L&D OR       Social History:    Social History     Tobacco Use    Smoking status: Current Every Day Smoker     Packs/day: 0.50     Years: 10.00     Pack years: 5.00     Types: Cigarettes    Smokeless tobacco: Never Used   Substance Use Topics    Alcohol use: No                                Ready to quit: Not Answered  Counseling given: Not Answered      Vital Signs (Current):   Vitals:    09/15/21 0500 09/15/21 0725 09/15/21 1308 09/15/21 1606   BP: 133/71 (!) 104/53 128/62 130/65   Pulse: 86 78 85 86   Resp:  18     Temp: 98.4 °F (36.9 °C) 98.6 °F (37 °C)     TempSrc:       SpO2: 99%      Weight:       Height:                                                  BP Readings from Last 3 Encounters:   09/15/21 130/65   21 (!) 130/94   18 (!) 144/86       NPO Status:                                                                                 BMI:   Wt Readings from Last 3 Encounters:   21 135 lb (61.2 kg)   21 135 lb (61.2 kg)   18 160 lb (72.6 kg)     Body mass index is 23.17 kg/m².     CBC:   Lab Results   Component Value Date    WBC 8.4 09/15/2021    RBC 3.55 09/15/2021    HGB 9.9 09/15/2021    HCT 28.7 09/15/2021    MCV 80.8 09/15/2021    RDW 12.5 09/15/2021     09/15/2021       CMP:   Lab Results   Component Value Date     2021    K 3.0 2021    CL 99 2021    CO2 24 2021    BUN 5 2021    CREATININE 0.4 09/14/2021    GFRAA >60 09/14/2021    LABGLOM >60 09/14/2021    GLUCOSE 83 09/14/2021    PROT 7.5 09/14/2021    CALCIUM 9.1 09/14/2021    BILITOT 0.4 09/14/2021    ALKPHOS 73 09/14/2021    AST 14 09/14/2021    ALT 26 09/14/2021       POC Tests: No results for input(s): POCGLU, POCNA, POCK, POCCL, POCBUN, POCHEMO, POCHCT in the last 72 hours. Coags: No results found for: PROTIME, INR, APTT    HCG (If Applicable):   Lab Results   Component Value Date    PREGTESTUR POSITIVE 09/14/2021        ABGs: No results found for: PHART, PO2ART, YAN1PWF, PNH2FQG, BEART, X8OCSGJZ     Type & Screen (If Applicable):  No results found for: LABABO, LABRH    Drug/Infectious Status (If Applicable):  No results found for: HIV, HEPCAB    COVID-19 Screening (If Applicable):   Lab Results   Component Value Date    COVID19 DETECTED 09/14/2021           Anesthesia Evaluation  Patient summary reviewed  Airway: Mallampati: Unable to assess / NA        Dental:    (+) upper dentures and edentulous      Pulmonary: breath sounds clear to auscultation  (+) current smoker (0.5 PPD. )                           Cardiovascular:Negative CV ROS            Rhythm: regular  Rate: normal                    Neuro/Psych:   Negative Neuro/Psych ROS              GI/Hepatic/Renal: Neg GI/Hepatic/Renal ROS            Endo/Other:                      ROS comment: Corona positive. Abdominal:             Vascular: negative vascular ROS. Other Findings:           Anesthesia Plan      general     ASA 3 - emergent       Induction: intravenous. MIPS: Postoperative opioids intended. Anesthetic plan and risks discussed with patient. Plan discussed with CRNA.     Attending anesthesiologist reviewed and agrees with Addie Moore MD   9/15/2021

## 2021-09-15 NOTE — ED NOTES
Pelvic exam completed by ED physician. Patient tolerated poorly. Large clots removed, continues to bleed bright red blood. Pain remains 10/10.       Jaimie Ramirez RN  09/14/21 9694

## 2021-09-15 NOTE — OP NOTE
DATE OF PROCEDURE:     9/15/2021    PATIENT NAME:    [facility]  SURGEON:     Tena Doe MD,MD   ASSISTANT:   PREOPERATIVE DIAGNOSIS:   Incomplete . POSTOPERATIVE DIAGNOSIS:   Same. OPERATION:      Suction evacuation of retained products of conception. ANESTHESIA:     General.   ESTIMATED BLOOD LOSS:    About 50 mL. COMPLICATIONS:     None. PROCEDURE: The patient was brought to the operating room where general   anesthesia was induced. She was then placed in lithotomy position. The   abdomen, perineum, and adjacent areas of the thighs were scrubbed. The   patient was then draped according to routine sterile precautions. The   bladder was catheterized. Examination under anesthesia revealed a boggy uterus about 12 weeks   gestation in size. The cervix was open and slightly dilated. There were   no adnexal masses. A weighted speculum was inserted in the vagina. The   cervix was identified and grasped with the Ring forceps. Large amount of tissue were noted protruding from the cervix. Those were removed with ring forceps. Selecting a size 12   suction curette, suction evacuation of the uterine cavity was performed. Finally, sharp curretage of the endometrium was performed and all tissues submitted to pathology. All instruments were then removed from the vagina. The uterus was massaged   and was found to be firm and well contracted. After an initial blood loss of   about 50 mL, complete hemostasis was observed. She was then taken out of   lithotomy position. Anesthesia was reversed, and she was transferred to the   recovery room in a stable condition. There were no operative complications.      Tena Doe MD  9/15/2021  6:38 PM

## 2021-09-15 NOTE — ED NOTES
Report given to RN from L&D  Report method by phone   The following was reviewed with receiving RN: hx drug abuse. 20g IV by u/s, tender, flushes well. Fentanyl x2 for pain. Passing clots, bright red bleeding. Current vital signs:  BP (!) 141/79   Pulse 95   Temp 97.2 °F (36.2 °C) (Temporal)   Resp 20   Ht 5' 4\" (1.626 m)   Wt 135 lb (61.2 kg)   LMP 08/18/2021   SpO2 99%   BMI 23.17 kg/m²                      Any medication or safety alerts were reviewed. Any pending diagnostics and notifications were also reviewed, as well as any safety concerns or issues, abnormal labs, abnormal imaging, and abnormal assessment findings. Questions were answered.             Abelino Beckwith RN  09/14/21 [id number]

## 2021-09-15 NOTE — PROGRESS NOTES
Covid test came back positive. Patient moved to 1 and placed in isolation. Oriented to room with call light in reach.

## 2021-09-16 NOTE — PROGRESS NOTES
patient returned to floor from PACU. VSS. Patient requesting something to eat. notified of plan to eat, void and able to be discharged.  Isolation precautions maintained

## 2021-09-16 NOTE — PROGRESS NOTES
patient walked out with boyfriend. Discharge instructions, prescriptions giving to patient with the instruction to go home and self isolate due to being covid positive.

## 2021-09-16 NOTE — ANESTHESIA POSTPROCEDURE EVALUATION
Department of Anesthesiology  Postprocedure Note    Patient: Alex Zabala  MRN: [id number]  YOB: 1990  Date of evaluation: 9/15/2021  Time:  8:24 PM     Procedure Summary     Date: 09/15/21 Room / Location: [address] [place] [address] 6[address] [facility]    Anesthesia Start: 1815 Anesthesia Stop: 9074    Procedure: DILATATION AND CURETTAGE SUCTION (N/A Vagina ) Diagnosis: (MISSED AB)    Surgeons: David Parrish MD Responsible Provider: Robbie Herrera MD    Anesthesia Type: general ASA Status: 3 - Emergent          Anesthesia Type: No value filed. Thiago Phase I: Thiago Score: 10    Thiago Phase II:      Last vitals: Reviewed and per EMR flowsheets.        Anesthesia Post Evaluation    Patient location during evaluation: PACU  Patient participation: complete - patient participated  Level of consciousness: awake  Pain score: 0  Airway patency: patent  Nausea & Vomiting: no nausea  Complications: no  Cardiovascular status: blood pressure returned to baseline  Respiratory status: acceptable  Hydration status: euvolemic

## 2021-09-16 NOTE — PROGRESS NOTES
Midline removed pressure held X5 minutes. patient requesting to leave. discharge instructions giving and patient voided.

## 2022-06-11 ENCOUNTER — HOSPITAL ENCOUNTER (INPATIENT)
Age: 32
LOS: 2 days | Discharge: LEFT BEFORE DISCHARGE | DRG: 383 | End: 2022-06-13
Attending: EMERGENCY MEDICINE | Admitting: FAMILY MEDICINE
Payer: MEDICARE

## 2022-06-11 ENCOUNTER — APPOINTMENT (OUTPATIENT)
Dept: CT IMAGING | Age: 32
DRG: 383 | End: 2022-06-11
Payer: MEDICARE

## 2022-06-11 DIAGNOSIS — L03.119 CELLULITIS AND ABSCESS OF LEG: Primary | ICD-10-CM

## 2022-06-11 DIAGNOSIS — L02.419 CELLULITIS AND ABSCESS OF LEG: Primary | ICD-10-CM

## 2022-06-11 DIAGNOSIS — F19.10 DRUG ABUSE (HCC): ICD-10-CM

## 2022-06-11 PROBLEM — L03.90 CELLULITIS: Status: ACTIVE | Noted: 2022-06-11

## 2022-06-11 LAB
ALBUMIN SERPL-MCNC: 5 G/DL (ref 3.5–5.2)
ALP BLD-CCNC: 132 U/L (ref 35–104)
ALT SERPL-CCNC: 12 U/L (ref 0–32)
AMPHETAMINE SCREEN, URINE: NOT DETECTED
ANION GAP SERPL CALCULATED.3IONS-SCNC: 15 MMOL/L (ref 7–16)
AST SERPL-CCNC: 15 U/L (ref 0–31)
BACTERIA: ABNORMAL /HPF
BARBITURATE SCREEN URINE: NOT DETECTED
BASOPHILS ABSOLUTE: 0.04 E9/L (ref 0–0.2)
BASOPHILS RELATIVE PERCENT: 0.3 % (ref 0–2)
BENZODIAZEPINE SCREEN, URINE: NOT DETECTED
BILIRUB SERPL-MCNC: 0.5 MG/DL (ref 0–1.2)
BILIRUBIN URINE: NEGATIVE
BLOOD, URINE: NEGATIVE
BUN BLDV-MCNC: 8 MG/DL (ref 6–20)
CALCIUM SERPL-MCNC: 10.1 MG/DL (ref 8.6–10.2)
CANNABINOID SCREEN URINE: POSITIVE
CHLORIDE BLD-SCNC: 95 MMOL/L (ref 98–107)
CLARITY: ABNORMAL
CO2: 24 MMOL/L (ref 22–29)
COCAINE METABOLITE SCREEN URINE: POSITIVE
COLOR: YELLOW
CREAT SERPL-MCNC: 0.6 MG/DL (ref 0.5–1)
EOSINOPHILS ABSOLUTE: 0 E9/L (ref 0.05–0.5)
EOSINOPHILS RELATIVE PERCENT: 0 % (ref 0–6)
EPITHELIAL CELLS, UA: ABNORMAL /HPF
FENTANYL SCREEN, URINE: POSITIVE
GFR AFRICAN AMERICAN: >60
GFR NON-AFRICAN AMERICAN: >60 ML/MIN/1.73
GLUCOSE BLD-MCNC: 120 MG/DL (ref 74–99)
GLUCOSE URINE: NEGATIVE MG/DL
HCG, URINE, POC: NEGATIVE
HCT VFR BLD CALC: 45.1 % (ref 34–48)
HEMOGLOBIN: 15.9 G/DL (ref 11.5–15.5)
IMMATURE GRANULOCYTES #: 0.04 E9/L
IMMATURE GRANULOCYTES %: 0.3 % (ref 0–5)
KETONES, URINE: NEGATIVE MG/DL
LACTIC ACID: 1 MMOL/L (ref 0.5–2.2)
LEUKOCYTE ESTERASE, URINE: NEGATIVE
LYMPHOCYTES ABSOLUTE: 2.08 E9/L (ref 1.5–4)
LYMPHOCYTES RELATIVE PERCENT: 16.8 % (ref 20–42)
Lab: ABNORMAL
Lab: NORMAL
MAGNESIUM: 2.2 MG/DL (ref 1.6–2.6)
MCH RBC QN AUTO: 27.4 PG (ref 26–35)
MCHC RBC AUTO-ENTMCNC: 35.3 % (ref 32–34.5)
MCV RBC AUTO: 77.8 FL (ref 80–99.9)
METHADONE SCREEN, URINE: NOT DETECTED
MONOCYTES ABSOLUTE: 0.57 E9/L (ref 0.1–0.95)
MONOCYTES RELATIVE PERCENT: 4.6 % (ref 2–12)
NEGATIVE QC PASS/FAIL: NORMAL
NEUTROPHILS ABSOLUTE: 9.62 E9/L (ref 1.8–7.3)
NEUTROPHILS RELATIVE PERCENT: 78 % (ref 43–80)
NITRITE, URINE: NEGATIVE
OPIATE SCREEN URINE: POSITIVE
OXYCODONE URINE: NOT DETECTED
PDW BLD-RTO: 13.3 FL (ref 11.5–15)
PH UA: 6 (ref 5–9)
PHENCYCLIDINE SCREEN URINE: NOT DETECTED
PLATELET # BLD: 307 E9/L (ref 130–450)
PMV BLD AUTO: 10.5 FL (ref 7–12)
POSITIVE QC PASS/FAIL: NORMAL
POTASSIUM REFLEX MAGNESIUM: 3.4 MMOL/L (ref 3.5–5)
PROTEIN UA: ABNORMAL MG/DL
RBC # BLD: 5.8 E12/L (ref 3.5–5.5)
RBC # BLD: NORMAL 10*6/UL
RBC UA: ABNORMAL /HPF (ref 0–2)
SEDIMENTATION RATE, ERYTHROCYTE: 35 MM/HR (ref 0–20)
SODIUM BLD-SCNC: 134 MMOL/L (ref 132–146)
SPECIFIC GRAVITY UA: 1.02 (ref 1–1.03)
TOTAL CK: 65 U/L (ref 20–180)
TOTAL PROTEIN: 9.8 G/DL (ref 6.4–8.3)
UROBILINOGEN, URINE: 0.2 E.U./DL
WBC # BLD: 12.4 E9/L (ref 4.5–11.5)
WBC UA: ABNORMAL /HPF (ref 0–5)

## 2022-06-11 PROCEDURE — 6360000002 HC RX W HCPCS: Performed by: EMERGENCY MEDICINE

## 2022-06-11 PROCEDURE — 80307 DRUG TEST PRSMV CHEM ANLYZR: CPT

## 2022-06-11 PROCEDURE — 80143 DRUG ASSAY ACETAMINOPHEN: CPT

## 2022-06-11 PROCEDURE — 36415 COLL VENOUS BLD VENIPUNCTURE: CPT

## 2022-06-11 PROCEDURE — 99285 EMERGENCY DEPT VISIT HI MDM: CPT

## 2022-06-11 PROCEDURE — 90714 TD VACC NO PRESV 7 YRS+ IM: CPT | Performed by: EMERGENCY MEDICINE

## 2022-06-11 PROCEDURE — 80053 COMPREHEN METABOLIC PANEL: CPT

## 2022-06-11 PROCEDURE — G0378 HOSPITAL OBSERVATION PER HR: HCPCS

## 2022-06-11 PROCEDURE — 99222 1ST HOSP IP/OBS MODERATE 55: CPT | Performed by: FAMILY MEDICINE

## 2022-06-11 PROCEDURE — 87070 CULTURE OTHR SPECIMN AEROBIC: CPT

## 2022-06-11 PROCEDURE — 73701 CT LOWER EXTREMITY W/DYE: CPT

## 2022-06-11 PROCEDURE — 96365 THER/PROPH/DIAG IV INF INIT: CPT

## 2022-06-11 PROCEDURE — 93005 ELECTROCARDIOGRAM TRACING: CPT | Performed by: EMERGENCY MEDICINE

## 2022-06-11 PROCEDURE — 96367 TX/PROPH/DG ADDL SEQ IV INF: CPT

## 2022-06-11 PROCEDURE — 1200000000 HC SEMI PRIVATE

## 2022-06-11 PROCEDURE — 87147 CULTURE TYPE IMMUNOLOGIC: CPT

## 2022-06-11 PROCEDURE — 90471 IMMUNIZATION ADMIN: CPT | Performed by: EMERGENCY MEDICINE

## 2022-06-11 PROCEDURE — 6360000004 HC RX CONTRAST MEDICATION: Performed by: RADIOLOGY

## 2022-06-11 PROCEDURE — 85651 RBC SED RATE NONAUTOMATED: CPT

## 2022-06-11 PROCEDURE — 85025 COMPLETE CBC W/AUTO DIFF WBC: CPT

## 2022-06-11 PROCEDURE — 81001 URINALYSIS AUTO W/SCOPE: CPT

## 2022-06-11 PROCEDURE — 87205 SMEAR GRAM STAIN: CPT

## 2022-06-11 PROCEDURE — 83605 ASSAY OF LACTIC ACID: CPT

## 2022-06-11 PROCEDURE — 87040 BLOOD CULTURE FOR BACTERIA: CPT

## 2022-06-11 PROCEDURE — 82077 ASSAY SPEC XCP UR&BREATH IA: CPT

## 2022-06-11 PROCEDURE — 87075 CULTR BACTERIA EXCEPT BLOOD: CPT

## 2022-06-11 PROCEDURE — 83735 ASSAY OF MAGNESIUM: CPT

## 2022-06-11 PROCEDURE — 82550 ASSAY OF CK (CPK): CPT

## 2022-06-11 PROCEDURE — 80179 DRUG ASSAY SALICYLATE: CPT

## 2022-06-11 PROCEDURE — 2580000003 HC RX 258: Performed by: EMERGENCY MEDICINE

## 2022-06-11 RX ORDER — ACETAMINOPHEN 325 MG/1
650 TABLET ORAL EVERY 6 HOURS PRN
Status: DISCONTINUED | OUTPATIENT
Start: 2022-06-11 | End: 2022-06-13 | Stop reason: HOSPADM

## 2022-06-11 RX ORDER — SODIUM CHLORIDE 0.9 % (FLUSH) 0.9 %
5-40 SYRINGE (ML) INJECTION EVERY 12 HOURS SCHEDULED
Status: DISCONTINUED | OUTPATIENT
Start: 2022-06-11 | End: 2022-06-13 | Stop reason: HOSPADM

## 2022-06-11 RX ORDER — ENOXAPARIN SODIUM 100 MG/ML
40 INJECTION SUBCUTANEOUS DAILY
Status: DISCONTINUED | OUTPATIENT
Start: 2022-06-12 | End: 2022-06-13 | Stop reason: HOSPADM

## 2022-06-11 RX ORDER — ACETAMINOPHEN 650 MG/1
650 SUPPOSITORY RECTAL EVERY 6 HOURS PRN
Status: DISCONTINUED | OUTPATIENT
Start: 2022-06-11 | End: 2022-06-13 | Stop reason: HOSPADM

## 2022-06-11 RX ORDER — 0.9 % SODIUM CHLORIDE 0.9 %
1000 INTRAVENOUS SOLUTION INTRAVENOUS ONCE
Status: COMPLETED | OUTPATIENT
Start: 2022-06-11 | End: 2022-06-11

## 2022-06-11 RX ORDER — SODIUM CHLORIDE 0.9 % (FLUSH) 0.9 %
5-40 SYRINGE (ML) INJECTION PRN
Status: DISCONTINUED | OUTPATIENT
Start: 2022-06-11 | End: 2022-06-13 | Stop reason: HOSPADM

## 2022-06-11 RX ORDER — SODIUM CHLORIDE 9 MG/ML
INJECTION, SOLUTION INTRAVENOUS PRN
Status: DISCONTINUED | OUTPATIENT
Start: 2022-06-11 | End: 2022-06-13 | Stop reason: HOSPADM

## 2022-06-11 RX ORDER — TETANUS AND DIPHTHERIA TOXOIDS ADSORBED 2; 2 [LF]/.5ML; [LF]/.5ML
0.5 INJECTION INTRAMUSCULAR ONCE
Status: COMPLETED | OUTPATIENT
Start: 2022-06-11 | End: 2022-06-11

## 2022-06-11 RX ORDER — PROMETHAZINE HYDROCHLORIDE 25 MG/1
12.5 TABLET ORAL EVERY 6 HOURS PRN
Status: DISCONTINUED | OUTPATIENT
Start: 2022-06-11 | End: 2022-06-13 | Stop reason: HOSPADM

## 2022-06-11 RX ORDER — ONDANSETRON 2 MG/ML
4 INJECTION INTRAMUSCULAR; INTRAVENOUS EVERY 6 HOURS PRN
Status: DISCONTINUED | OUTPATIENT
Start: 2022-06-11 | End: 2022-06-13 | Stop reason: HOSPADM

## 2022-06-11 RX ADMIN — VANCOMYCIN HYDROCHLORIDE 1250 MG: 10 INJECTION, POWDER, LYOPHILIZED, FOR SOLUTION INTRAVENOUS at 22:43

## 2022-06-11 RX ADMIN — PIPERACILLIN SODIUM AND TAZOBACTAM SODIUM 3375 MG: 3; 375 INJECTION, POWDER, FOR SOLUTION INTRAVENOUS at 21:30

## 2022-06-11 RX ADMIN — TETANUS AND DIPHTHERIA TOXOIDS ADSORBED 0.5 ML: 2; 2 INJECTION INTRAMUSCULAR at 21:30

## 2022-06-11 RX ADMIN — SODIUM CHLORIDE 1000 ML: 9 INJECTION, SOLUTION INTRAVENOUS at 21:26

## 2022-06-11 RX ADMIN — IOPAMIDOL 50 ML: 755 INJECTION, SOLUTION INTRAVENOUS at 22:03

## 2022-06-11 ASSESSMENT — PAIN - FUNCTIONAL ASSESSMENT: PAIN_FUNCTIONAL_ASSESSMENT: NONE - DENIES PAIN

## 2022-06-11 ASSESSMENT — ENCOUNTER SYMPTOMS
NAUSEA: 0
SINUS PAIN: 0
VOMITING: 0
BACK PAIN: 0
ABDOMINAL PAIN: 0
EYE PAIN: 0
DIARRHEA: 0
SORE THROAT: 0
SHORTNESS OF BREATH: 0

## 2022-06-12 VITALS
BODY MASS INDEX: 25.49 KG/M2 | RESPIRATION RATE: 18 BRPM | DIASTOLIC BLOOD PRESSURE: 63 MMHG | TEMPERATURE: 97.9 F | SYSTOLIC BLOOD PRESSURE: 119 MMHG | OXYGEN SATURATION: 97 % | HEIGHT: 61 IN | HEART RATE: 83 BPM | WEIGHT: 135 LBS

## 2022-06-12 PROBLEM — E87.6 HYPOKALEMIA: Status: ACTIVE | Noted: 2022-06-12

## 2022-06-12 PROBLEM — F19.90 SUBSTANCE USE DISORDER: Status: ACTIVE | Noted: 2022-06-12

## 2022-06-12 PROBLEM — F17.200 TOBACCO USE DISORDER: Status: ACTIVE | Noted: 2022-06-12

## 2022-06-12 LAB
ACETAMINOPHEN LEVEL: <5 MCG/ML (ref 10–30)
EKG ATRIAL RATE: 112 BPM
EKG P AXIS: 68 DEGREES
EKG P-R INTERVAL: 122 MS
EKG Q-T INTERVAL: 328 MS
EKG QRS DURATION: 70 MS
EKG QTC CALCULATION (BAZETT): 447 MS
EKG R AXIS: 92 DEGREES
EKG T AXIS: 52 DEGREES
EKG VENTRICULAR RATE: 112 BPM
ETHANOL: <10 MG/DL (ref 0–0.08)
SALICYLATE, SERUM: <0.3 MG/DL (ref 0–30)
TRICYCLIC ANTIDEPRESSANTS SCREEN SERUM: NEGATIVE NG/ML

## 2022-06-12 PROCEDURE — 87070 CULTURE OTHR SPECIMN AEROBIC: CPT

## 2022-06-12 PROCEDURE — 6370000000 HC RX 637 (ALT 250 FOR IP): Performed by: FAMILY MEDICINE

## 2022-06-12 PROCEDURE — G0378 HOSPITAL OBSERVATION PER HR: HCPCS

## 2022-06-12 PROCEDURE — 6360000002 HC RX W HCPCS: Performed by: FAMILY MEDICINE

## 2022-06-12 PROCEDURE — 2580000003 HC RX 258: Performed by: FAMILY MEDICINE

## 2022-06-12 PROCEDURE — 96366 THER/PROPH/DIAG IV INF ADDON: CPT

## 2022-06-12 PROCEDURE — 99232 SBSQ HOSP IP/OBS MODERATE 35: CPT | Performed by: INTERNAL MEDICINE

## 2022-06-12 PROCEDURE — 87205 SMEAR GRAM STAIN: CPT

## 2022-06-12 PROCEDURE — 1200000000 HC SEMI PRIVATE

## 2022-06-12 PROCEDURE — 6370000000 HC RX 637 (ALT 250 FOR IP): Performed by: SPECIALIST

## 2022-06-12 PROCEDURE — 96372 THER/PROPH/DIAG INJ SC/IM: CPT

## 2022-06-12 RX ORDER — POTASSIUM BICARBONATE 25 MEQ/1
25 TABLET, EFFERVESCENT ORAL ONCE
Status: DISCONTINUED | OUTPATIENT
Start: 2022-06-12 | End: 2022-06-12 | Stop reason: CLARIF

## 2022-06-12 RX ORDER — NICOTINE 21 MG/24HR
1 PATCH, TRANSDERMAL 24 HOURS TRANSDERMAL DAILY
Status: DISCONTINUED | OUTPATIENT
Start: 2022-06-12 | End: 2022-06-13 | Stop reason: HOSPADM

## 2022-06-12 RX ORDER — LEVOFLOXACIN 500 MG/1
500 TABLET, FILM COATED ORAL DAILY
Status: DISCONTINUED | OUTPATIENT
Start: 2022-06-12 | End: 2022-06-13 | Stop reason: HOSPADM

## 2022-06-12 RX ORDER — LINEZOLID 600 MG/1
600 TABLET, FILM COATED ORAL EVERY 12 HOURS SCHEDULED
Status: DISCONTINUED | OUTPATIENT
Start: 2022-06-12 | End: 2022-06-13 | Stop reason: HOSPADM

## 2022-06-12 RX ORDER — CLINDAMYCIN HYDROCHLORIDE 150 MG/1
300 CAPSULE ORAL EVERY 6 HOURS SCHEDULED
Status: DISCONTINUED | OUTPATIENT
Start: 2022-06-12 | End: 2022-06-13 | Stop reason: HOSPADM

## 2022-06-12 RX ORDER — NICOTINE 21 MG/24HR
1 PATCH, TRANSDERMAL 24 HOURS TRANSDERMAL DAILY
Status: DISCONTINUED | OUTPATIENT
Start: 2022-06-12 | End: 2022-06-12

## 2022-06-12 RX ADMIN — POTASSIUM BICARBONATE 20 MEQ: 782 TABLET, EFFERVESCENT ORAL at 00:52

## 2022-06-12 RX ADMIN — SODIUM CHLORIDE, PRESERVATIVE FREE 10 ML: 5 INJECTION INTRAVENOUS at 08:13

## 2022-06-12 RX ADMIN — LEVOFLOXACIN 500 MG: 500 TABLET, FILM COATED ORAL at 14:14

## 2022-06-12 RX ADMIN — CLINDAMYCIN HYDROCHLORIDE 300 MG: 150 CAPSULE ORAL at 18:36

## 2022-06-12 RX ADMIN — LINEZOLID 600 MG: 600 TABLET, FILM COATED ORAL at 13:42

## 2022-06-12 RX ADMIN — ENOXAPARIN SODIUM 40 MG: 100 INJECTION SUBCUTANEOUS at 08:11

## 2022-06-12 RX ADMIN — ACETAMINOPHEN 650 MG: 325 TABLET ORAL at 00:52

## 2022-06-12 RX ADMIN — PIPERACILLIN SODIUM AND TAZOBACTAM SODIUM 3375 MG: 3; 375 INJECTION, POWDER, FOR SOLUTION INTRAVENOUS at 05:35

## 2022-06-12 RX ADMIN — CLINDAMYCIN HYDROCHLORIDE 300 MG: 150 CAPSULE ORAL at 14:14

## 2022-06-12 ASSESSMENT — PAIN DESCRIPTION - ORIENTATION
ORIENTATION: RIGHT

## 2022-06-12 ASSESSMENT — PAIN DESCRIPTION - ONSET
ONSET: ON-GOING
ONSET: ON-GOING

## 2022-06-12 ASSESSMENT — PAIN DESCRIPTION - LOCATION
LOCATION: LEG

## 2022-06-12 ASSESSMENT — PAIN SCALES - GENERAL
PAINLEVEL_OUTOF10: 1
PAINLEVEL_OUTOF10: 4
PAINLEVEL_OUTOF10: 5
PAINLEVEL_OUTOF10: 1
PAINLEVEL_OUTOF10: 3

## 2022-06-12 ASSESSMENT — PAIN DESCRIPTION - PAIN TYPE
TYPE: ACUTE PAIN
TYPE: ACUTE PAIN

## 2022-06-12 ASSESSMENT — PAIN - FUNCTIONAL ASSESSMENT
PAIN_FUNCTIONAL_ASSESSMENT: PREVENTS OR INTERFERES SOME ACTIVE ACTIVITIES AND ADLS

## 2022-06-12 ASSESSMENT — PAIN DESCRIPTION - DESCRIPTORS
DESCRIPTORS: BURNING
DESCRIPTORS: BURNING
DESCRIPTORS: DISCOMFORT;BURNING;SORE

## 2022-06-12 ASSESSMENT — PAIN DESCRIPTION - FREQUENCY
FREQUENCY: INTERMITTENT
FREQUENCY: INTERMITTENT

## 2022-06-12 ASSESSMENT — LIFESTYLE VARIABLES: HOW OFTEN DO YOU HAVE A DRINK CONTAINING ALCOHOL: NEVER

## 2022-06-12 NOTE — CONSULTS
[facility]  Consult Note    [address] [place] [address]  [place], [address]  Phone [phone]   [phone]      Admit Date: 2022  8:35 PM  Pt Name: Saadia Blair  MRN: [id number]  : 1990  Reason for Consult:    Chief Complaint   Patient presents with    Abscess     right calf starting three days ago thats draining redish/yellow      Requesting Physician:  Jevon Gupta DO  PCP: Melquiades Cuellar MD  History Obtained From:  patient, chart   ID consulted for Drug abuse ([facility] [address] [F19.10]  Cellulitis [L03.90]  Cellulitis and abscess of leg [L03.119, L02.419]  on hospital day [address]       Chief Complaint   Patient presents with    Abscess     right calf starting three days ago thats draining redish/yellow      HISTORYOF PRESENT ILLNESS   Saadia Blair is a 32 y.o. female who presents with   has no past medical history on file. ED TRIAGEVITALS  BP: (!) 108/58, Temp: 98.1 °F (36.7 °C), Heart Rate: 70, Resp: 16, SpO2: 96 %  HPI  Pt came in with rle cellulitis/abscess  It started after mowing her mother's lawn who lives in [place]. She lives in a trailer  She stays with her boyfriend who lives in [facility]  She was using neosporin with h202  Pt has pian at site no f/c/n/v/d  afebrile on RA  Wbc12.4 cr0.6  CT Diffuse right lower leg cellulitis with suggestion of, but not definitive,   mid lower leg posteromedial subcutaneous small abscess.      piperacillin-tazobactam (ZOSYN) 3,375 mg in dextrose 5 % 50 mL IVPB extended infusion (mini-bag), Q8H  vancomycin (VANCOCIN) 1,000 mg in dextrose 5 % 250 mL IVPB, Q12H          REVIEW OF SYSTEMS     CONSTITUTIONAL:   No fever, chills, weight loss  ALLERGIES:    No urticaria, hay fever,    EYES:     No blurry vision, loss of vision, eye pain  ENT:      No hearing loss, sore throat  CARDIOVASCULAR:  No chest pain or palpitations  RESPIRATORY:   No cough, sob  ENDOCRINE:    No increase thirst, urination HEME-LYMPH:   No easy bruising or bleeding  GI:     No nausea, vomiting or diarrhea  :     No urinary complaints  NEURO:    No seizures, stroke, HA  MUSCULOSKELETAL:  No muscle aches or pain, no joint pain  SKIN:     No rash or itch  PSYCH:    No depression or anxiety    Medications Prior to Admission: ibuprofen (ADVIL;MOTRIN) 600 MG tablet, Take 1 tablet by mouth 4 times daily as needed for Pain  Prenatal Vit-Fe Fumarate-FA (PRENATAL VITAMIN) 28-0.8 MG TABS tablet, Take 1 tablet by mouth daily (Patient not taking: Reported on 6/12/2022)  CURRENT MEDICATIONS     Current Facility-Administered Medications:     nicotine (NICODERM CQ) 21 MG/24HR 1 patch, 1 patch, TransDERmal, Daily, Vicky Stover DO, 1 patch at 06/12/22 0102    sodium chloride flush 0.9 % injection 5-40 mL, 5-40 mL, IntraVENous, 2 times per day, Vicky Stover DO, 10 mL at 06/12/22 0813    sodium chloride flush 0.9 % injection 5-40 mL, 5-40 mL, IntraVENous, PRN, Vicky Stover DO    0.9 % sodium chloride infusion, , IntraVENous, PRN, Vicky Stover, DO    enoxaparin (LOVENOX) injection 40 mg, 40 mg, SubCUTAneous, Daily, Vicky Stover DO, 40 mg at 06/12/22 7178    acetaminophen (TYLENOL) tablet 650 mg, 650 mg, Oral, Q6H PRN, 650 mg at 06/12/22 0052 **OR** acetaminophen (TYLENOL) suppository 650 mg, 650 mg, Rectal, Q6H PRN, Vicky Stover DO    piperacillin-tazobactam (ZOSYN) 3,375 mg in dextrose 5 % 50 mL IVPB extended infusion (mini-bag), 3,375 mg, IntraVENous, Q8H, Vicky Stover DO, Last Rate: 12.5 mL/hr at 06/12/22 0535, 3,375 mg at 06/12/22 0535    vancomycin (VANCOCIN) 1,000 mg in dextrose 5 % 250 mL IVPB, 15 mg/kg, IntraVENous, Q12H, Vicky Stover DO    promethazine (PHENERGAN) tablet 12.5 mg, 12.5 mg, Oral, Q6H PRN **OR** ondansetron (ZOFRAN) injection 4 mg, 4 mg, IntraVENous, Q6H PRN, Vicky Stover DO  ALLERGIES     Patient has no known allergies.   Immunization History   Administered Date(s) Administered    Td (Adult), 2 Lf Tetanus Toxoid, Pf (Td, Absorbed) 2022      Internal Administration   First Dose      Second Dose           Last COVID Lab SARS-CoV-2, NAAT (no units)   Date Value   2021 DETECTED (A)            PAST MEDICAL HISTORY   History reviewed. No pertinent past medical history. SURGICAL HISTORY       Past Surgical History:   Procedure Laterality Date    DILATION AND CURETTAGE OF UTERUS N/A 9/15/2021    DILATATION AND CURETTAGE SUCTION performed by Rachel Barksdale MD at [facility] [address] N/A 2018     SECTION performed by Derik Harden MD at [facility] L[facility] OR     FAMILY HISTORY     History reviewed. No pertinent family history. SOCIAL HISTORY       Social History     Socioeconomic History    Marital status: Single     Spouse name: None    Number of children: 1    Years of education: None    Highest education level: None   Occupational History    None   Tobacco Use    Smoking status: Current Every Day Smoker     Packs/day: 0.50     Years: 10.00     Pack years: 5.00     Types: Cigarettes    Smokeless tobacco: Never Used   Vaping Use    Vaping Use: Never used   Substance and Sexual Activity    Alcohol use: No    Drug use: Yes     Types: Marijuana (Jack Bile), Cocaine, IV     Comment: fentanyl Last used yesterday     Sexual activity: Yes     Partners: Male   Other Topics Concern    None   Social History Narrative    None     Social Determinants of Health     Financial Resource Strain:     Difficulty of Paying Living Expenses: Not on file   Food Insecurity:     Worried About Running Out of Food in the Last Year: Not on file    Jony of Food in the Last Year: Not on file   Transportation Needs:     Lack of Transportation (Medical): Not on file    Lack of Transportation (Non-Medical):  Not on file   Physical Activity:     Days of Exercise per Week: Not on file    Minutes of Exercise per Session: Not on file   Stress:  Feeling of Stress : Not on file   Social Connections:     Frequency of Communication with Friends and Family: Not on file    Frequency of Social Gatherings with Friends and Family: Not on file    Attends Spiritism Services: Not on file    Active Member of Clubs or Organizations: Not on file    Attends Club or Organization Meetings: Not on file    Marital Status: Not on file   Intimate Partner Violence:     Fear of Current or Ex-Partner: Not on file    Emotionally Abused: Not on file    Physically Abused: Not on file    Sexually Abused: Not on file   Housing Stability:     Unable to Pay for Housing in the Last Year: Not on file    Number of [place] in the Last Year: Not on file    Unstable Housing in the Last Year: Not on file     ·      [address]       ED Triage Vitals   BP Temp Temp Source Heart Rate Resp SpO2 Height Weight   06/11/22 2040 06/11/22 2037 06/11/22 2037 06/11/22 2030 06/11/22 2049 06/11/22 2030 06/12/22 0000 06/11/22 2101   (!) 156/95 98.6 °F (37 °C) Oral (!) 112 18 98 % 5' 1\" (1.549 m) 135 lb (61.2 kg)     Vitals:    Vitals:    06/11/22 2213 06/11/22 2348 06/12/22 0000 06/12/22 0530   BP: (!) 137/97 115/75 135/67 (!) 108/58   Pulse: 90 84 84 70   Resp: 18 18 18 16   Temp:   97.9 °F (36.6 °C) 98.1 °F (36.7 °C)   TempSrc:   Oral Oral   SpO2: 100% 99% 98% 96%   Weight:   135 lb (61.2 kg)    Height:   5' 1\" (1.549 m)      Physical Exam   Constitutional/General: Alert and oriented, NAD  Head: NC/AT  Eyes: PERRL, EOMI  Mouth: Normal mucosa, no thrush poor dentition   Neck: Supple, full ROM,    Pulmonary: Lungs clear to auscultation bilaterally. Not in respiratory distress  Cardiovascular:  Regular rate and rhythm, no murmurs, gallops, or rubs. Abdomen: Soft, + BS. No distension. Nontender. Extremities: Moves all extremities x 4.    Warm and well perfused see pictures  Pulses:  Distal pulses intact  Skin: Warm and dry without rash tattoos  Neurologic:    No focal deficits  Psych: Normal Affect  6/12 6/11           DIAGNOSTIC RESULTS   RADIOLOGY:   CT TIBIA FIBULA RIGHT W CONTRAST    Result Date: 6/11/2022  EXAMINATION: CT OF THE RIGHT TIBIA AND FIBULA WITH CONTRAST 6/11/2022 7:48 pm TECHNIQUE: CT of the right tibia and fibula was performed with the administration of intravenous contrast.  Multiplanar reformatted images are provided for review. Automated exposure control, iterative reconstruction, and/or weight based adjustment of the mA/kV was utilized to reduce the radiation dose to as low as reasonably achievable. Contrast amount: 50 ml Isovue-370. Dose: Total Exam DLP: 293.03 mGy*cm COMPARISON: None. HISTORY ORDERING SYSTEM PROVIDED HISTORY: evaluate abscess TECHNOLOGIST PROVIDED HISTORY: Reason for exam:->evaluate abscess Decision Support Exception - unselect if not a suspected or confirmed emergency medical condition->Emergency Medical Condition (MA) FINDINGS: Bones: No evidence of acute fracture or dislocation. No aggressive appearing osseous abnormality or periostitis. Soft Tissue: Diffuse cellulitis of the lower leg and ankle is identified. Skin thickening is greatest along the medial surface. A mid lower leg posteromedial subcutaneous focus of localized fluid is identified measuring approximately 2.7 x 1.9 x 1.5 cm, which does not demonstrate defined peripheral enhancement. No soft tissue gas is seen. There is no abnormal enhancement or swelling of the calf musculature. Joint: No significant degenerative changes. No osseous erosions. Diffuse right lower leg cellulitis with suggestion of, but not definitive, mid lower leg posteromedial subcutaneous small abscess.      LABS  Recent Labs     06/11/22 2055   WBC 12.4*   HGB 15.9*   HCT 45.1   MCV 77.8*        Recent Labs     06/11/22 2055      K 3.4*   CL 95*   CO2 24   BUN 8   CREATININE 0.6   GFRAA >60   LABGLOM >60   GLUCOSE 120*   PROT 9.8*   LABALBU 5.0   CALCIUM 10.1   BILITOT 0.5 ALKPHOS 132*   AST 15   ALT 12        Lab Results   Component Value Date    SEDRATE 35 (H) 06/11/2022     No results found for: [id number]  Lab Results   Component Value Date    COVID19 DETECTED 09/14/2021          Lab Results   Component Value Date    COVID19 DETECTED 09/14/2021     COVID-19/CYNTHIA-COV2 LABS  Recent Labs     06/11/22 2055   AST 15   ALT 12     MICROBIOLOGY:       FINAL IMPRESSION    Patient is a 32 y.o. female who presented with   Chief Complaint   Patient presents with    Abscess     right calf starting three days ago thats draining redish/yellow     and admitted for Drug abuse ([facility] [address] [F19.10]  Cellulitis [L03.90]  Cellulitis and abscess of leg [L03.119, L02.419] with necrosis     Leukocytosis  H/o IVDU   Substance abuse    Lost IV site   Adjust to po atbx til cx back      linezolid (ZYVOX) tablet 600 mg, 2 times per day  levoFLOXacin (LEVAQUIN) tablet 500 mg, Daily  clindamycin (CLEOCIN) capsule 300 mg, 4 times per day       Suggest surgery to see  Spoke  with DR Brito       piperacillin-tazobactam (ZOSYN) 3,375 mg in dextrose 5 % 50 mL IVPB extended infusion (mini-bag), Q8H  vancomycin (VANCOCIN) 1,000 mg in dextrose 5 % 250 mL IVPB, Q12H     Wound recx    Imaging and labs were reviewed per medical records and any ID pertinent labs were addressed with the patient. The patient/FAMILY  was educated about the diagnosis, prognosis, indications, risks and benefits of treatment. An opportunity to ask questions was given to the patient/FAMILY and questions were answered. Thank you for involving me in the care of Erich Luna. Please do not hesitate to call for any questions or concerns.          Electronically signed by Esperanza Whitaker MD on 6/12/2022 at 11:07 AM

## 2022-06-12 NOTE — PLAN OF CARE
Problem: Discharge Planning  Goal: Discharge to home or other facility with appropriate resources  Outcome: Progressing  Flowsheets (Taken 6/12/2022 0025)  Discharge to home or other facility with appropriate resources: Identify discharge learning needs (meds, wound care, etc)     Problem: Pain  Goal: Verbalizes/displays adequate comfort level or baseline comfort level  Outcome: Progressing     Problem: Safety - Adult  Goal: Free from fall injury  Outcome: Progressing     Problem: Skin/Tissue Integrity  Goal: Absence of new skin breakdown  Description: 1. Monitor for areas of redness and/or skin breakdown  2. Assess vascular access sites hourly  3. Every 4-6 hours minimum:  Change oxygen saturation probe site  4. Every 4-6 hours:  If on nasal continuous positive airway pressure, respiratory therapy assess nares and determine need for appliance change or resting period.   Outcome: Progressing

## 2022-06-12 NOTE — ED PROVIDER NOTES
Chief Complaint   Patient presents with    Abscess     right calf starting three days ago thats draining redish/yellow        49-year-old female noncontributory past medical history presenting with chief complaint of quickly developing abscess on her right lower extremity. She said that a couple days ago when she woke up it was as if there was a bug bite on her right calf, yesterday it got bigger and today a blister on top ruptured and she came to the emergency department. The area is warm, tender, firm, abscessed area in the center with overlying loose blistered skin that has ruptured. Nothing is made it better or worse, not associate with numbness, weakness, tingling. She is not a diabetic which does admit to smoking. She is a history of IVDU and never had sepsis or endocarditis. She has never had an abscess like this before and does not have any anywhere else. Denies fever, chills, chest pain, nausea, vomiting, diarrhea. No bites that she knows of and no allergic contacts. No other acute complaints. Review of Systems   Constitutional: Negative for chills and fever. HENT: Negative for ear pain, sinus pain and sore throat. Eyes: Negative for pain. Respiratory: Negative for shortness of breath. Cardiovascular: Negative for chest pain. Gastrointestinal: Negative for abdominal pain, diarrhea, nausea and vomiting. Genitourinary: Negative for flank pain and pelvic pain. Musculoskeletal: Negative for back pain and neck pain. Skin: Positive for wound. Negative for rash. Neurological: Negative for seizures and headaches. Hematological: Negative for adenopathy. All other systems reviewed and are negative. Physical Exam  Vitals and nursing note reviewed. Constitutional:       Appearance: She is well-developed. HENT:      Head: Normocephalic and atraumatic.       Right Ear: External ear normal.      Left Ear: External ear normal.      Nose: Nose normal.      Mouth/Throat: broad-spectrum antibiotics of Zosyn and vancomycin. Dr. Vicky Mejia will admit the patient for further management and observation. ED Course as of 06/12/22 0027   Sat Jun 11, 2022 2052 ATTENDING PROVIDER ATTESTATION:     I have personally performed and/or participated in the history, exam, medical decision making, and procedures and agree with all pertinent clinical information unless otherwise noted. I have also reviewed and agree with the past medical, family and social history unless otherwise noted. I have discussed this patient in detail with the resident, and provided the instruction and education regarding patient here for right leg abscess. Has a remote history of IV drug use, nothing recently she states, states 3 days ago developed small pimple-like lesion on the right medial posterior calf which developed into increasing erythema, swelling and then ruptured and drained. The area is red, warm and tender with a open center. Denies fevers, sweats or chills at home. .  My findings/plan: Patient sitting the bed resting comfortably no distress. Heart rate minimally tachycardic, lungs are clear and equal.  Abdomen nontender. Scarring noted to the bilateral arms consistent with history of IV drug use. No abscesses in the upper extremities. Does have large area of erythema and swelling to the general right medial posterior calf with swelling near circumferentially around the leg, no crepitus. There is a moist necrotic center to this lesion with no fluctuance and no current purulent drainage. The foot and leg are neurovascular intact distally. No tender cords. This appears to be a large cellulitic area with possible abscess in the center although no fluctuance on physical exam.       [NC]   [id number] Case discussed with Dr. [facility] [place], detailed overview given, they will admit the patient.  [NC]      ED Course User Index  [NC] Marsha Victor DO        ED Course as of 06/12/22 0027   Sat Jun 11, 2022 2052 ATTENDING PROVIDER ATTESTATION:     I have personally performed and/or participated in the history, exam, medical decision making, and procedures and agree with all pertinent clinical information unless otherwise noted. I have also reviewed and agree with the past medical, family and social history unless otherwise noted. I have discussed this patient in detail with the resident, and provided the instruction and education regarding patient here for right leg abscess. Has a remote history of IV drug use, nothing recently she states, states 3 days ago developed small pimple-like lesion on the right medial posterior calf which developed into increasing erythema, swelling and then ruptured and drained. The area is red, warm and tender with a open center. Denies fevers, sweats or chills at home. .  My findings/plan: Patient sitting the bed resting comfortably no distress. Heart rate minimally tachycardic, lungs are clear and equal.  Abdomen nontender. Scarring noted to the bilateral arms consistent with history of IV drug use. No abscesses in the upper extremities. Does have large area of erythema and swelling to the general right medial posterior calf with swelling near circumferentially around the leg, no crepitus. There is a moist necrotic center to this lesion with no fluctuance and no current purulent drainage. The foot and leg are neurovascular intact distally. No tender cords. This appears to be a large cellulitic area with possible abscess in the center although no fluctuance on physical exam.       [NC]   [id number] Case discussed with Dr. Yoon Woods, detailed overview given, they will admit the patient. [NC]      ED Course User Index  [NC] Kimmie Donnelly DO       --------------------------------------------- PAST HISTORY ---------------------------------------------  Past Medical History:  has no past medical history on file.     Past Surgical History:  has a past surgical history that includes Mouth surgery (); pr  delivery only (N/A, 2018); and Dilation and curettage of uterus (N/A, 9/15/2021). Social History:  reports that she has been smoking cigarettes. She has a 5.00 pack-year smoking history. She has never used smokeless tobacco. She reports current drug use. Drugs: Marijuana Garon Kettle), Cocaine, and IV. She reports that she does not drink alcohol. Family History: family history is not on file. The patients home medications have been reviewed. Allergies: Patient has no known allergies.     -------------------------------------------------- RESULTS -------------------------------------------------    LABS:  Results for orders placed or performed during the hospital encounter of 22   CBC with Auto Differential   Result Value Ref Range    WBC 12.4 (H) 4.5 - 11.5 E9/L    RBC 5.80 (H) 3.50 - 5.50 E12/L    Hemoglobin 15.9 (H) 11.5 - 15.5 g/dL    Hematocrit 45.1 34.0 - 48.0 %    MCV 77.8 (L) 80.0 - 99.9 fL    MCH 27.4 26.0 - 35.0 pg    MCHC 35.3 (H) 32.0 - 34.5 %    RDW 13.3 11.5 - 15.0 fL    Platelets 668 877 - 231 E9/L    MPV 10.5 7.0 - 12.0 fL    Neutrophils % 78.0 43.0 - 80.0 %    Immature Granulocytes % 0.3 0.0 - 5.0 %    Lymphocytes % 16.8 (L) 20.0 - 42.0 %    Monocytes % 4.6 2.0 - 12.0 %    Eosinophils % 0.0 0.0 - 6.0 %    Basophils % 0.3 0.0 - 2.0 %    Neutrophils Absolute 9.62 (H) 1.80 - 7.30 E9/L    Immature Granulocytes # 0.04 E9/L    Lymphocytes Absolute 2.08 1.50 - 4.00 E9/L    Monocytes Absolute 0.57 0.10 - 0.95 E9/L    Eosinophils Absolute 0.00 (L) 0.05 - 0.50 E9/L    Basophils Absolute 0.04 0.00 - 0.20 E9/L    RBC Morphology Normal    Comprehensive Metabolic Panel w/ Reflex to MG   Result Value Ref Range    Sodium 134 132 - 146 mmol/L    Potassium reflex Magnesium 3.4 (L) 3.5 - 5.0 mmol/L    Chloride 95 (L) 98 - 107 mmol/L    CO2 24 22 - 29 mmol/L    Anion Gap 15 7 - 16 mmol/L    Glucose 120 (H) 74 - 99 mg/dL    BUN 8 6 - 20 mg/dL    CREATININE 0.6 0.5 - 1.0 mg/dL    GFR Non-African American >60 >=60 mL/min/1.73    GFR African American >60     Calcium 10.1 8.6 - 10.2 mg/dL    Total Protein 9.8 (H) 6.4 - 8.3 g/dL    Albumin 5.0 3.5 - 5.2 g/dL    Total Bilirubin 0.5 0.0 - 1.2 mg/dL    Alkaline Phosphatase 132 (H) 35 - 104 U/L    ALT 12 0 - 32 U/L    AST 15 0 - 31 U/L   Sedimentation Rate   Result Value Ref Range    Sed Rate 35 (H) 0 - 20 mm/Hr   Lactic Acid   Result Value Ref Range    Lactic Acid 1.0 0.5 - 2.2 mmol/L   CK   Result Value Ref Range    Total CK 65 20 - 180 U/L   Urinalysis with Microscopic   Result Value Ref Range    Color, UA Yellow Straw/Yellow    Clarity, UA SLCLOUDY Clear    Glucose, Ur Negative Negative mg/dL    Bilirubin Urine Negative Negative    Ketones, Urine Negative Negative mg/dL    Specific Gravity, UA 1.025 1.005 - 1.030    Blood, Urine Negative Negative    pH, UA 6.0 5.0 - 9.0    Protein, UA TRACE Negative mg/dL    Urobilinogen, Urine 0.2 <2.0 E.U./dL    Nitrite, Urine Negative Negative    Leukocyte Esterase, Urine Negative Negative    WBC, UA 1-3 0 - 5 /HPF    RBC, UA 0-1 0 - 2 /HPF    Epithelial Cells, UA MODERATE /HPF    Bacteria, UA MANY (A) None Seen /HPF   URINE DRUG SCREEN   Result Value Ref Range    Amphetamine Screen, Urine NOT DETECTED Negative <1000 ng/mL    Barbiturate Screen, Ur NOT DETECTED Negative < 200 ng/mL    Benzodiazepine Screen, Urine NOT DETECTED Negative < 200 ng/mL    Cannabinoid Scrn, Ur POSITIVE (A) Negative < 50ng/mL    Cocaine Metabolite Screen, Urine POSITIVE (A) Negative < 300 ng/mL    Opiate Scrn, Ur POSITIVE (A) Negative < 300ng/mL    PCP Screen, Urine NOT DETECTED Negative < 25 ng/mL    Methadone Screen, Urine NOT DETECTED Negative <300 ng/mL    Oxycodone Urine NOT DETECTED Negative <100 ng/mL    FENTANYL SCREEN, URINE POSITIVE (A) Negative <1 ng/mL    Drug Screen Comment: see below    Serum Drug Screen   Result Value Ref Range    Ethanol Lvl <10 mg/dL    Acetaminophen Level <5.0 (L) 10.0 - 30.0 mcg/mL    Salicylate, Serum <3.5 0.0 - 30.0 mg/dL   Magnesium   Result Value Ref Range    Magnesium 2.2 1.6 - 2.6 mg/dL   POC Pregnancy Urine   Result Value Ref Range    HCG, Urine, POC Negative Negative    Lot Number [id number]     Positive QC Pass/Fail Pass     Negative QC Pass/Fail Pass    EKG 12 Lead   Result Value Ref Range    Ventricular Rate 112 BPM    Atrial Rate 112 BPM    P-R Interval 122 ms    QRS Duration 70 ms    Q-T Interval 328 ms    QTc Calculation (Bazett) 447 ms    P Axis 68 degrees    R Axis 92 degrees    T Axis 52 degrees       RADIOLOGY:  CT TIBIA FIBULA RIGHT W CONTRAST   Final Result   Diffuse right lower leg cellulitis with suggestion of, but not definitive,   mid lower leg posteromedial subcutaneous small abscess. ------------------------- NURSING NOTES AND VITALS REVIEWED ---------------------------  Date / Time Roomed:  6/11/2022  8:35 PM  ED Bed Assignment:  [id number]    The nursing notes within the ED encounter and vital signs as below have been reviewed. Patient Vitals for the past 24 hrs:   BP Temp Temp src Pulse Resp SpO2 Height Weight   06/12/22 0000 135/67 97.9 °F (36.6 °C) Oral 84 18 98 % 5' 1\" (1.549 m) 135 lb (61.2 kg)   06/11/22 2348 115/75   84 18 99 %     06/11/22 2213 (!) 137/97   90 18 100 %     06/11/22 2101     16   135 lb (61.2 kg)   06/11/22 2049     18      06/11/22 2040 (!) 156/95          06/11/22 2037  98.6 °F (37 °C) Oral        06/11/22 2030    (!) 112  98 %         Oxygen Saturation Interpretation: Normal    ------------------------------------------ PROGRESS NOTES ------------------------------------------  Re-evaluation(s):  Time: 2300  Patients symptoms show no change  Repeat physical examination is not changed    Counseling:  I have spoken with the patient and discussed todays results, in addition to providing specific details for the plan of care and counseling regarding the diagnosis and prognosis. Their questions are answered at this time and they are agreeable with the plan of admission.    --------------------------------- ADDITIONAL PROVIDER NOTES ---------------------------------  Consultations:  Time: 2326. Spoke with Dr. Chay Falk. Discussed case. They will admit the patient. This patient's ED course included: a personal history and physicial examination, re-evaluation prior to disposition, multiple bedside re-evaluations, IV medications and complex medical decision making and emergency management    This patient has remained hemodynamically stable during their ED course. Diagnosis:  1. Cellulitis and abscess of leg    2. Drug abuse ([facility] [address]        Disposition:  Patient's disposition: Admit to med/surg floor  Patient's condition is stable.            Karolyn Valencia DO  Resident  06/12/22 3000
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
## 2022-06-12 NOTE — PROGRESS NOTES
[address] [place], [place] [address]ist   Progress Note    Admitting Date and Time: 6/11/2022  8:35 PM  Admit Dx: Drug abuse ([facility] [address] [F19.10]  Cellulitis [L03.90]  Cellulitis and abscess of leg [L03.119, L02.419]    Subjective/interval history:    Pt admitted yesterday evening with cellulitis and abscess of the right lower extremity. Started after she was doing yard work, suspects it may be due to spider bite. Today she is noticed the pain, redness, and swelling have decreased after IV antibiotics. She lost IV access and was changed to oral antibiotics for the time being per ID. ROS: denies fever, chills, cp, sob, n/v, HA unless stated above.  nicotine  1 patch TransDERmal Daily    linezolid  600 mg Oral 2 times per day    levoFLOXacin  500 mg Oral Daily    clindamycin  300 mg Oral 4 times per day    sodium chloride flush  5-40 mL IntraVENous 2 times per day    enoxaparin  40 mg SubCUTAneous Daily    piperacillin-tazobactam  3,375 mg IntraVENous Q8H    vancomycin  15 mg/kg IntraVENous Q12H     sodium chloride flush, 5-40 mL, PRN  sodium chloride, , PRN  acetaminophen, 650 mg, Q6H PRN   Or  acetaminophen, 650 mg, Q6H PRN  promethazine, 12.5 mg, Q6H PRN   Or  ondansetron, 4 mg, Q6H PRN         Objective:    BP (!) 108/58   Pulse 70   Temp 98.1 °F (36.7 °C) (Oral)   Resp 16   Ht 5' 1\" (1.549 m)   Wt 135 lb (61.2 kg)   LMP 03/17/2022 (Approximate)   SpO2 96%   Breastfeeding Unknown   BMI 25.51 kg/m²   General Appearance: alert and oriented to person, place and time and in no acute distress  Skin: warm and dry  Head: normocephalic and atraumatic  Eyes: pupils equal, round, and reactive to light, extraocular eye movements intact, conjunctivae normal  ENT: Oral mucosa moist.  Extremely poor dentition with multiple dental caries  Neck: neck supple and non tender without mass   Pulmonary/Chest: Nonlabored on room air.   Clear to auscultation bilaterally  Cardiovascular: normal rate, normal S1 and S2 and no carotid bruits  Abdomen: soft, non-tender, non-distended, normal bowel sounds, no masses or organomegaly  Extremities: Right lower extremity with erythema and skin necrosis with area of fluctuance beneath. Photo shown below. No cyanosis or clubbing. No left lower extremity edema. Neurologic: no cranial nerve deficit and speech normal                Recent Labs     06/11/22 2055      K 3.4*   CL 95*   CO2 24   BUN 8   CREATININE 0.6   GLUCOSE 120*   CALCIUM 10.1       Recent Labs     06/11/22 2055   ALKPHOS 132*   PROT 9.8*   LABALBU 5.0   BILITOT 0.5   AST 15   ALT 12       Recent Labs     06/11/22 2055   WBC 12.4*   RBC 5.80*   HGB 15.9*   HCT 45.1   MCV 77.8*   MCH 27.4   MCHC 35.3*   RDW 13.3      MPV 10.5         Radiology:   CT TIBIA FIBULA RIGHT W CONTRAST   Final Result   Diffuse right lower leg cellulitis with suggestion of, but not definitive,   mid lower leg posteromedial subcutaneous small abscess. Assessment and Plan:  Principal Problem:    Cellulitis and abscess of leg  Active Problems:    Cellulitis    Hypokalemia    Tobacco use disorder    Substance use disorder  Resolved Problems:    * No resolved hospital problems. *      1. Cellulitis and abscess of right lower extremity  -Was on vancomycin and Zosyn with improvement  -Now on oral linezolid, clindamycin, and levofloxacin per ID  -Consult general surgery for possible incision and drainage    2. Hypokalemia  -Oral replacement  -Recheck tomorrow    3. Polysubstance use  -Denies any current drug use, though toxicology screen positive for cannabinoid, opiate, cocaine, and fentanyl  -She spoke with peer recovery but declines referral for treatment  -No signs of substance withdrawal at this time    4. Tobacco abuse  -Counseled on cessation    DVT prophylaxis: Enoxaparin  CODE STATUS: Full code    Disposition: Anticipate discharge to home.   Possibly tomorrow if still improving on oral antibiotics    NOTE: This report was transcribed using voice recognition software. Every effort was made to ensure accuracy; however, inadvertent computerized transcription errors may be present.      Electronically signed by Yanet Pollock DO on 6/12/2022 at 1:31 PM

## 2022-06-12 NOTE — PLAN OF CARE
Problem: Pain  Goal: Verbalizes/displays adequate comfort level or baseline comfort level  6/12/2022 0834 by Charles Monahan RN  Outcome: Progressing     Problem: Safety - Adult  Goal: Free from fall injury  6/12/2022 0834 by Charles Monahan RN  Outcome: Progressing     Problem: Skin/Tissue Integrity  Goal: Absence of new skin breakdown  Description: 1. Monitor for areas of redness and/or skin breakdown  2. Assess vascular access sites hourly  3. Every 4-6 hours minimum:  Change oxygen saturation probe site  4. Every 4-6 hours:  If on nasal continuous positive airway pressure, respiratory therapy assess nares and determine need for appliance change or resting period.   6/12/2022 0834 by Charles Monahan RN  Outcome: Progressing

## 2022-06-12 NOTE — CARE COORDINATION
6/12/2022 0916 CM note: No covid testing. SW consult noted for \"rehab resources\". Met with pt for consult and transition of care needs. Pt resides with her boyfriend in a 1 story house. She is independent with ADLS, does not drive. No hx DME/HHC. Pt does not have a PCP. [facility] PCP list given and reviewed with pt, pt agreeable for [facility] or [employer] locations. CM assistant to make new PCP appt. [facility]  discussed with patient and she declines referra/resourcesl at this time. Pt plans to return home at discharge. Unit CM/SW will follow for possible wound care/abx needs.  Deanna JACOBSEN

## 2022-06-13 ENCOUNTER — ANESTHESIA EVENT (OUTPATIENT)
Dept: OPERATING ROOM | Age: 32
End: 2022-06-13

## 2022-06-13 ENCOUNTER — ANESTHESIA (OUTPATIENT)
Dept: OPERATING ROOM | Age: 32
End: 2022-06-13

## 2022-06-13 LAB
ALBUMIN SERPL-MCNC: 3.3 G/DL (ref 3.5–5.2)
ALP BLD-CCNC: 104 U/L (ref 35–104)
ALT SERPL-CCNC: 15 U/L (ref 0–32)
ANION GAP SERPL CALCULATED.3IONS-SCNC: 10 MMOL/L (ref 7–16)
AST SERPL-CCNC: 22 U/L (ref 0–31)
BASOPHILS ABSOLUTE: 0.03 E9/L (ref 0–0.2)
BASOPHILS RELATIVE PERCENT: 0.4 % (ref 0–2)
BILIRUB SERPL-MCNC: <0.2 MG/DL (ref 0–1.2)
BUN BLDV-MCNC: 7 MG/DL (ref 6–20)
CALCIUM SERPL-MCNC: 9.2 MG/DL (ref 8.6–10.2)
CHLORIDE BLD-SCNC: 104 MMOL/L (ref 98–107)
CO2: 21 MMOL/L (ref 22–29)
CREAT SERPL-MCNC: 0.6 MG/DL (ref 0.5–1)
EOSINOPHILS ABSOLUTE: 0 E9/L (ref 0.05–0.5)
EOSINOPHILS RELATIVE PERCENT: 0 % (ref 0–6)
GFR AFRICAN AMERICAN: >60
GFR NON-AFRICAN AMERICAN: >60 ML/MIN/1.73
GLUCOSE BLD-MCNC: 84 MG/DL (ref 74–99)
HCT VFR BLD CALC: 35.6 % (ref 34–48)
HEMOGLOBIN: 11.9 G/DL (ref 11.5–15.5)
IMMATURE GRANULOCYTES #: 0.03 E9/L
IMMATURE GRANULOCYTES %: 0.4 % (ref 0–5)
LYMPHOCYTES ABSOLUTE: 2.13 E9/L (ref 1.5–4)
LYMPHOCYTES RELATIVE PERCENT: 26.2 % (ref 20–42)
MCH RBC QN AUTO: 27.5 PG (ref 26–35)
MCHC RBC AUTO-ENTMCNC: 33.4 % (ref 32–34.5)
MCV RBC AUTO: 82.2 FL (ref 80–99.9)
MONOCYTES ABSOLUTE: 0.66 E9/L (ref 0.1–0.95)
MONOCYTES RELATIVE PERCENT: 8.1 % (ref 2–12)
NEUTROPHILS ABSOLUTE: 5.27 E9/L (ref 1.8–7.3)
NEUTROPHILS RELATIVE PERCENT: 64.9 % (ref 43–80)
PDW BLD-RTO: 13.5 FL (ref 11.5–15)
PLATELET # BLD: 289 E9/L (ref 130–450)
PMV BLD AUTO: 11 FL (ref 7–12)
POTASSIUM REFLEX MAGNESIUM: 4.3 MMOL/L (ref 3.5–5)
RBC # BLD: 4.33 E12/L (ref 3.5–5.5)
SODIUM BLD-SCNC: 135 MMOL/L (ref 132–146)
TOTAL PROTEIN: 7.6 G/DL (ref 6.4–8.3)
WBC # BLD: 8.1 E9/L (ref 4.5–11.5)

## 2022-06-13 PROCEDURE — G0378 HOSPITAL OBSERVATION PER HR: HCPCS

## 2022-06-13 PROCEDURE — 99254 IP/OBS CNSLTJ NEW/EST MOD 60: CPT | Performed by: SURGERY

## 2022-06-13 PROCEDURE — 6370000000 HC RX 637 (ALT 250 FOR IP): Performed by: SPECIALIST

## 2022-06-13 PROCEDURE — 2709999900 HC NON-CHARGEABLE SUPPLY: Performed by: SURGERY

## 2022-06-13 PROCEDURE — 86703 HIV-1/HIV-2 1 RESULT ANTBDY: CPT

## 2022-06-13 PROCEDURE — 6370000000 HC RX 637 (ALT 250 FOR IP): Performed by: FAMILY MEDICINE

## 2022-06-13 PROCEDURE — 99238 HOSP IP/OBS DSCHRG MGMT 30/<: CPT | Performed by: INTERNAL MEDICINE

## 2022-06-13 PROCEDURE — 80053 COMPREHEN METABOLIC PANEL: CPT

## 2022-06-13 PROCEDURE — 36415 COLL VENOUS BLD VENIPUNCTURE: CPT

## 2022-06-13 PROCEDURE — 85025 COMPLETE CBC W/AUTO DIFF WBC: CPT

## 2022-06-13 RX ORDER — DOXYCYCLINE HYCLATE 100 MG
100 TABLET ORAL 2 TIMES DAILY
Qty: 28 TABLET | Refills: 0 | Status: SHIPPED | OUTPATIENT
Start: 2022-06-13 | End: 2022-06-26

## 2022-06-13 RX ORDER — CEFDINIR 300 MG/1
300 CAPSULE ORAL 2 TIMES DAILY
Qty: 28 CAPSULE | Refills: 0 | Status: SHIPPED | OUTPATIENT
Start: 2022-06-13 | End: 2022-06-26

## 2022-06-13 RX ADMIN — CLINDAMYCIN HYDROCHLORIDE 300 MG: 150 CAPSULE ORAL at 06:15

## 2022-06-13 RX ADMIN — LINEZOLID 600 MG: 600 TABLET, FILM COATED ORAL at 08:14

## 2022-06-13 RX ADMIN — CLINDAMYCIN HYDROCHLORIDE 300 MG: 150 CAPSULE ORAL at 01:30

## 2022-06-13 ASSESSMENT — LIFESTYLE VARIABLES: SMOKING_STATUS: 1

## 2022-06-13 NOTE — ANESTHESIA PRE PROCEDURE
Department of Anesthesiology  Preprocedure Note       Name:  Erich Luna   Age:  32 y.o.  :  1990                                          MRN:  [id number]         Date:  2022      Surgeon: Julieta Matamoros):  Laurie Acosta MD    Procedure: Procedure(s):  RIGHT LOWER LEG ABSCESS INCISION AND DRAINAGE    Medications prior to admission:   Prior to Admission medications    Medication Sig Start Date End Date Taking?  Authorizing Provider   ibuprofen (ADVIL;MOTRIN) 600 MG tablet Take 1 tablet by mouth 4 times daily as needed for Pain 9/15/21   Tena Doe MD   Prenatal Vit-Fe Fumarate-FA (PRENATAL VITAMIN) 28-0.8 MG TABS tablet Take 1 tablet by mouth daily  Patient not taking: Reported on 2022 3/18/18   Kendrick Escobar, APRN - CNP       Current medications:    Current Facility-Administered Medications   Medication Dose Route Frequency Provider Last Rate Last Admin    nicotine (NICODERM CQ) 21 MG/24HR 1 patch  1 patch TransDERmal Daily Krish Cage DO   1 patch at 22 0129    linezolid (ZYVOX) tablet 600 mg  600 mg Oral 2 times per day Esperanza Whitaker MD   600 mg at 22 1342    levoFLOXacin (LEVAQUIN) tablet 500 mg  500 mg Oral Daily Esperanza Whitaker MD   500 mg at 22 1414    clindamycin (CLEOCIN) capsule 300 mg  300 mg Oral 4 times per day Esperanza Whitaker MD   300 mg at 22 0615    sodium chloride flush 0.9 % injection 5-40 mL  5-40 mL IntraVENous 2 times per day Krish Cage, DO   10 mL at 22 0813    sodium chloride flush 0.9 % injection 5-40 mL  5-40 mL IntraVENous PRN Krish Cage, DO        0.9 % sodium chloride infusion   IntraVENous PRN Krish Cage, DO        enoxaparin (LOVENOX) injection 40 mg  40 mg SubCUTAneous Daily Krish Cage, DO   40 mg at 22 0811    acetaminophen (TYLENOL) tablet 650 mg  650 mg Oral Q6H PRN Krish Cage, DO   650 mg at 22 0052    Or    acetaminophen (TYLENOL) suppository 650 mg  650 mg Rectal Q6H PRN Sofía Rushing, DO        piperacillin-tazobactam (ZOSYN) 3,375 mg in dextrose 5 % 50 mL IVPB extended infusion (mini-bag)  3,375 mg IntraVENous Q8H Sofía Rushing, DO   Stopped at 22 0935    vancomycin (VANCOCIN) 1,000 mg in dextrose 5 % 250 mL IVPB  15 mg/kg IntraVENous Q12H Sofía Rushing, DO        promethazine (PHENERGAN) tablet 12.5 mg  12.5 mg Oral Q6H PRN Sofía Rushing, DO        Or    ondansetron [facility] injection 4 mg  4 mg IntraVENous Q6H PRN Sofía Rushing, DO           Allergies:  No Known Allergies    Problem List:    Patient Active Problem List   Diagnosis Code    Vomiting of pregnancy, antepartum O21.9    Normal pregnancy in third trimester Z34.93    Incomplete  O03.4    Cellulitis L03.90    Cellulitis and abscess of leg L03.119, L02.419    Hypokalemia E87.6    Tobacco use disorder F17.200    Substance use disorder F19.90       Past Medical History:  History reviewed. No pertinent past medical history. Past Surgical History:        Procedure Laterality Date    DILATION AND CURETTAGE OF UTERUS N/A 9/15/2021    DILATATION AND CURETTAGE SUCTION performed by Rubin Clay MD at [address] [place] [address] N/A 2018     SECTION performed by Pasquale Bowen MD at [facility] L&D OR       Social History:    Social History     Tobacco Use    Smoking status: Current Every Day Smoker     Packs/day: 0.50     Years: 10.00     Pack years: 5.00     Types: Cigarettes    Smokeless tobacco: Never Used   Substance Use Topics    Alcohol use:  No                                Ready to quit: Not Answered  Counseling given: Not Answered      Vital Signs (Current):   Vitals:    22 2348 22 0000 22 0530 22 1830   BP: 115/75 135/67 (!) 108/58 119/63   Pulse: 84 84 70 83   Resp: 18 18 16 18   Temp:  36.6 °C (97.9 °F) 36.7 °C (98.1 °F) 36.6 °C (97.9 °F)   TempSrc:  Oral Oral Oral   SpO2: 99% 98% 96% 97%   Weight: 135 lb (61.2 kg)     Height:  5' 1\" (1.549 m)                                                BP Readings from Last 3 Encounters:   06/12/22 119/63   09/15/21 120/86   09/15/21 117/60       NPO Status:                                                                                 BMI:   Wt Readings from Last 3 Encounters:   06/12/22 135 lb (61.2 kg)   09/14/21 135 lb (61.2 kg)   09/14/21 135 lb (61.2 kg)     Body mass index is 25.51 kg/m². CBC:   Lab Results   Component Value Date    WBC 12.4 06/11/2022    RBC 5.80 06/11/2022    HGB 15.9 06/11/2022    HCT 45.1 06/11/2022    MCV 77.8 06/11/2022    RDW 13.3 06/11/2022     06/11/2022       CMP:   Lab Results   Component Value Date     06/11/2022    K 3.4 06/11/2022    CL 95 06/11/2022    CO2 24 06/11/2022    BUN 8 06/11/2022    CREATININE 0.6 06/11/2022    GFRAA >60 06/11/2022    LABGLOM >60 06/11/2022    GLUCOSE 120 06/11/2022    PROT 9.8 06/11/2022    CALCIUM 10.1 06/11/2022    BILITOT 0.5 06/11/2022    ALKPHOS 132 06/11/2022    AST 15 06/11/2022    ALT 12 06/11/2022       POC Tests: No results for input(s): POCGLU, POCNA, POCK, POCCL, POCBUN, POCHEMO, POCHCT in the last 72 hours.     Coags: No results found for: PROTIME, INR, APTT    HCG (If Applicable):   Lab Results   Component Value Date    PREGTESTUR POSITIVE 09/14/2021        ABGs: No results found for: PHART, PO2ART, QWP3RQU, TQE8TYS, BEART, Z2EKHPGL     Type & Screen (If Applicable):  No results found for: LABABO, LABRH    Drug/Infectious Status (If Applicable):  No results found for: HIV, HEPCAB    COVID-19 Screening (If Applicable):   Lab Results   Component Value Date    COVID19 DETECTED 09/14/2021           Anesthesia Evaluation  Patient summary reviewed and Nursing notes reviewed no history of anesthetic complications:   Airway: Mallampati: II  TM distance: >3 FB   Neck ROM: full     Dental:    (+) poor dentition      Pulmonary:normal exam  breath sounds clear to auscultation  (+) current smoker                           Cardiovascular:Negative CV ROS  Exercise tolerance: good (>4 METS),         ECG reviewed  Rhythm: regular  Rate: normal           Beta Blocker:  Not on Beta Blocker         Neuro/Psych:   Negative Neuro/Psych ROS              GI/Hepatic/Renal: Neg GI/Hepatic/Renal ROS            Endo/Other: Negative Endo/Other ROS                    Abdominal:       Abdomen: soft. Vascular: negative vascular ROS. Other Findings:      EKG 6/11/22  Sinus tachycardia  Possible Left atrial enlargement  Rightward axis  Nonspecific ST abnormality  Abnormal ECG     Anesthesia Plan      MAC     ASA 2       Induction: intravenous. MIPS: Postoperative opioids intended and Prophylactic antiemetics administered. Anesthetic plan and risks discussed with patient. Use of blood products discussed with patient whom consented to blood products. Plan discussed with CRNA and attending.                     Eriac Grant RN   6/13/2022

## 2022-06-13 NOTE — CARE COORDINATION
Patient wanted to have [facility] PCP, she was discharged before making the appointment. I did try to call patient at home, and did not get an answer. She does have [facility] listed as current PCP.  Electronically signed by Lanette Hart on 6/13/2022 at 12:33 PM

## 2022-06-13 NOTE — CONSULTS
General Surgery Consult    Patient's Name/Date of Birth: Alex Zabala / 1990    Date: 2022     Consulting Surgeon: Connor Ordoñez M.D.    PCP: David Tuttle MD     Chief Complaint: right leg abscess    HPI:   Alex Zabala is a 32 y.o. female who presents for  evaluation of right leg pain, erythema, cellulitis, abscess. Timing is constant, radiation to none, alleviated by nothing and started 2-3 days ago, severity 10/10. Patient has history of IVDA. States she noticed swelling and pain 2-3 days ago, has been on oral abx and states right calf is now feeling better. States it has been draining since yesterday. Denies fevers/chillls. CT right lower extremity shows cellulitis and abscess cavity 2.7 x 1.9 x 1.5 cm. History reviewed. No pertinent past medical history.     Past Surgical History:   Procedure Laterality Date    DILATION AND CURETTAGE OF UTERUS N/A 9/15/2021    DILATATION AND CURETTAGE SUCTION performed by David Parrish MD at [address] N/A 2018     SECTION performed by David Tuttle MD at [facility] L&D OR       Current Facility-Administered Medications   Medication Dose Route Frequency Provider Last Rate Last Admin    nicotine (NICODERM CQ) 21 MG/24HR 1 patch  1 patch TransDERmal Daily Lucas Cohen DO   1 patch at 22 0129    linezolid (ZYVOX) tablet 600 mg  600 mg Oral 2 times per day Sandra Fischer MD   600 mg at 22 1342    levoFLOXacin (LEVAQUIN) tablet 500 mg  500 mg Oral Daily Sandra Fischer MD   500 mg at 22 1414    clindamycin (CLEOCIN) capsule 300 mg  300 mg Oral 4 times per day Sandra Fischer MD   300 mg at 22 0615    sodium chloride flush 0.9 % injection 5-40 mL  5-40 mL IntraVENous 2 times per day Lucas Cohen DO   10 mL at 22 0813    sodium chloride flush 0.9 % injection 5-40 mL  5-40 mL IntraVENous PRN Lucas Cohen DO        0.9 % sodium chloride infusion IntraVENous PRN Lorna Bunk, DO        enoxaparin (LOVENOX) injection 40 mg  40 mg SubCUTAneous Daily Lorna Bunk, DO   40 mg at 06/12/22 7731    acetaminophen (TYLENOL) tablet 650 mg  650 mg Oral Q6H PRN Lorna Bunk, DO   650 mg at 06/12/22 0052    Or    acetaminophen (TYLENOL) suppository 650 mg  650 mg Rectal Q6H PRN Lorna Bunk, DO        piperacillin-tazobactam (ZOSYN) 3,375 mg in dextrose 5 % 50 mL IVPB extended infusion (mini-bag)  3,375 mg IntraVENous Q8H Lorna Bunk, DO   Stopped at 06/12/22 0935    vancomycin (VANCOCIN) 1,000 mg in dextrose 5 % 250 mL IVPB  15 mg/kg IntraVENous Q12H Lorna Bunk, DO        promethazine (PHENERGAN) tablet 12.5 mg  12.5 mg Oral Q6H PRN Lorna Bunk, DO        Or    [facility] injection 4 mg  4 mg IntraVENous Q6H PRN Lorna Bunk, DO           No Known Allergies    The patient has a family history that is negative for severe cardiovascular or respiratory issues, negative for reaction to anesthesia.     Social History     Socioeconomic History    Marital status: Single     Spouse name: Not on file    Number of children: 1    Years of education: Not on file    Highest education level: Not on file   Occupational History    Not on file   Tobacco Use    Smoking status: Current Every Day Smoker     Packs/day: 0.50     Years: 10.00     Pack years: 5.00     Types: Cigarettes    Smokeless tobacco: Never Used   Vaping Use    Vaping Use: Never used   Substance and Sexual Activity    Alcohol use: No    Drug use: Yes     Types: Marijuana (Margurite Ortiz), Cocaine, IV     Comment: fentanyl Last used yesterday     Sexual activity: Yes     Partners: Male   Other Topics Concern    Not on file   Social History Narrative    Not on file     Social Determinants of Health     Financial Resource Strain:     Difficulty of Paying Living Expenses: Not on file   Food Insecurity:     Worried About Running Out of Food in the Last Year: Not on file  Ran Out of Food in the Last Year: Not on file   Transportation Needs:     Lack of Transportation (Medical): Not on file    Lack of Transportation (Non-Medical): Not on file   Physical Activity:     Days of Exercise per Week: Not on file    Minutes of Exercise per Session: Not on file   Stress:     Feeling of Stress : Not on file   Social Connections:     Frequency of Communication with Friends and Family: Not on file    Frequency of Social Gatherings with Friends and Family: Not on file    Attends Gnosticism Services: Not on file    Active Member of [address] [place] [address] or Organizations: Not on file    Attends Club or Organization Meetings: Not on file    Marital Status: Not on file   Intimate Partner Violence:     Fear of Current or Ex-Partner: Not on file    Emotionally Abused: Not on file    Physically Abused: Not on file    Sexually Abused: Not on file   Housing Stability:     Unable to Pay for Housing in the Last Year: Not on file    Number of [place] in the Last Year: Not on file    Unstable Housing in the Last Year: Not on file           Review of Systems  General ROS: negative for - chills, fatigue or fever  ENT ROS: negative for - headaches, hearing change or nasal congestion  Endocrine ROS: negative for - breast changes or galactorrhea, no polyuria  Respiratory ROS: negative for - hemoptysis, orthopnea or pleuritic pain  Cardiovascular ROS: negative for - dyspnea on exertion, edema or loss of consciousness  Gastrointestinal ROS: negative for - blood in stools, heartburn, hematemesis or melena  : no polyuria, no dysuria  Musculoskeletal ROS: negative for - gait disturbance  Dermatological ROS: negative for - acne, dry skin or eczema  Neuro ROS: no recent memory loss or mood swings.     Physical exam:   /63   Pulse 83   Temp 97.9 °F (36.6 °C) (Oral)   Resp 18   Ht 5' 1\" (1.549 m)   Wt 135 lb (61.2 kg)   LMP 03/17/2022 (Approximate)   SpO2 97%   Breastfeeding Unknown   BMI 25.51 kg/m²   General appearance:  NAD  Head: NCAT, PERRLA, EOMI, red conjunctiva  Neck: supple, no masses  Lungs: CTAB, equal chest rise bilateral  Heart: Reg rate  Abdomen: soft, nontender, nondistended,  Skin; no lesions  Gu: no cva tenderness  Extremities: right lower extremity with erythema, area of induration on right calf with purulent drainage and necrotic skin changes. Radiology:  CT RLE, as per HPI    Assessment:  32 y.o. female with right lower extremity abscess    Plan:  - NPO  - OR for I&D right lower extremity  - abx as per ID      Mattie Bird MD  2022  7:00 AM    General Surgery Consult    Patient's Name/Date of Birth: Teresa Zarate / 1990    Date: 2022     Consulting Surgeon: Brooks Barthel M.D.    PCP: Louann Pompa MD     Chief Complaint: abscess    HPI:   Teresa Zarate is a 32 y.o. female who presents for  evaluation of abscess of right leg. No other symptoms complaints and is  on antibiotics. Has not previous episodes of abscess. History reviewed. No pertinent past medical history.     Past Surgical History:   Procedure Laterality Date    DILATION AND CURETTAGE OF UTERUS N/A 9/15/2021    DILATATION AND CURETTAGE SUCTION performed by Onofre Stewart MD at [address] N/A 2018     SECTION performed by Louann Pompa MD at [facility] L&D OR       Current Facility-Administered Medications   Medication Dose Route Frequency Provider Last Rate Last Admin    nicotine (NICODERM CQ) 21 MG/24HR 1 patch  1 patch TransDERmal Daily Luzmaria Duran DO   1 patch at 22 0129    linezolid (ZYVOX) tablet 600 mg  600 mg Oral 2 times per day Mook Martin MD   600 mg at 22 0814    levoFLOXacin (LEVAQUIN) tablet 500 mg  500 mg Oral Daily Mook Martin MD   500 mg at 22 1414    clindamycin (CLEOCIN) capsule 300 mg  300 mg Oral 4 times per day Mook Martin MD   300 mg at 22 0615    sodium chloride flush 0.9 % injection 5-40 mL  5-40 mL IntraVENous 2 times per day Atlanta Shimon, DO   10 mL at 06/12/22 0813    sodium chloride flush 0.9 % injection 5-40 mL  5-40 mL IntraVENous PRN [place] Shimon, DO        0.9 % sodium chloride infusion   IntraVENous PRN [place] Shimon, DO        enoxaparin (LOVENOX) injection 40 mg  40 mg SubCUTAneous Daily Atlanta Shimon, DO   40 mg at 06/12/22 0811    acetaminophen (TYLENOL) tablet 650 mg  650 mg Oral Q6H PRN [place] Shimon, DO   650 mg at 06/12/22 0052    Or    acetaminophen (TYLENOL) suppository 650 mg  650 mg Rectal Q6H PRN [place] Shimon, DO        piperacillin-tazobactam (ZOSYN) 3,375 mg in dextrose 5 % 50 mL IVPB extended infusion (mini-bag)  3,375 mg IntraVENous Q8H [place] Shimon, DO   Stopped at 06/12/22 0935    vancomycin (VANCOCIN) 1,000 mg in dextrose 5 % 250 mL IVPB  15 mg/kg IntraVENous Q12H [place] Shimon, DO        promethazine (PHENERGAN) tablet 12.5 mg  12.5 mg Oral Q6H PRN [place] Shimon, DO        Or    [facility] 4 mg  4 mg IntraVENous Q6H PRN [place] Shimon, DO           No Known Allergies    The patient has a family history that is negative for severe cardiovascular or respiratory issues, negative for reaction to anesthesia.     Social History     Socioeconomic History    Marital status: Single     Spouse name: Not on file    Number of children: 1    Years of education: Not on file    Highest education level: Not on file   Occupational History    Not on file   Tobacco Use    Smoking status: Current Every Day Smoker     Packs/day: 0.50     Years: 10.00     Pack years: 5.00     Types: Cigarettes    Smokeless tobacco: Never Used   Vaping Use    Vaping Use: Never used   Substance and Sexual Activity    Alcohol use: No    Drug use: Yes     Types: Marijuana (Peng Buerger), Cocaine, IV     Comment: fentanyl Last used yesterday     Sexual activity: Yes     Partners: Male   Other Topics Concern    Not on file Social History Narrative    Not on file     Social Determinants of Health     Financial Resource Strain:     Difficulty of Paying Living Expenses: Not on file   Food Insecurity:     Worried About Running Out of Food in the Last Year: Not on file    Jony of Food in the Last Year: Not on file   Transportation Needs:     Lack of Transportation (Medical): Not on file    Lack of Transportation (Non-Medical):  Not on file   Physical Activity:     Days of Exercise per Week: Not on file    Minutes of Exercise per Session: Not on file   Stress:     Feeling of Stress : Not on file   Social Connections:     Frequency of Communication with Friends and Family: Not on file    Frequency of Social Gatherings with Friends and Family: Not on file    Attends Protestant Services: Not on file    Active Member of [address] [place] [address] [employer] or Organizations: Not on file    Attends Club or Organization Meetings: Not on file    Marital Status: Not on file   Intimate Partner Violence:     Fear of Current or Ex-Partner: Not on file    Emotionally Abused: Not on file    Physically Abused: Not on file    Sexually Abused: Not on file   Housing Stability:     Unable to Pay for Housing in the Last Year: Not on file    Number of [place] in the Last Year: Not on file    Unstable Housing in the Last Year: Not on file           Review of Systems  General ROS: negative for - chills, fatigue or fever  ENT ROS: negative for - headaches, hearing change or nasal congestion  Endocrine ROS: negative for - breast changes or galactorrhea, no polyuria  Respiratory ROS: negative for - hemoptysis, orthopnea or pleuritic pain  Cardiovascular ROS: negative for - dyspnea on exertion, edema or loss of consciousness  Gastrointestinal ROS: negative for - blood in stools, heartburn, hematemesis or melena  : no polyuria, no dysuria  Musculoskeletal ROS: negative for - gait disturbance  Dermatological ROS: negative for - acne, dry skin or eczema  Neuro ROS: no recent memory loss or mood swings. Physical exam:   /63   Pulse 83   Temp 97.9 °F (36.6 °C) (Oral)   Resp 18   Ht 5' 1\" (1.549 m)   Wt 135 lb (61.2 kg)   LMP 03/17/2022 (Approximate)   SpO2 97%   Breastfeeding Unknown   BMI 25.51 kg/m²   General appearance:  NAD  Head: NCAT, PERRLA, EOMI, red conjunctiva  Neck: supple, no masses  Lungs: CTAB, equal chest rise bilateral  Heart: Reg rate  Abdomen: soft, nontender, nondistended,  Skin; fluctuant abscess on right leg  Gu: no cva tenderness  Extremities: extremities normal, atraumatic, no cyanosis or edema      Assessment:  32 y.o. female with abscess of right leg    Plan:    abx and will I and D in OR  Discussed the risk, benefits and alternatives of surgery including wound infections, bleeding, scar  and the risks of anesthetic including MI, CVA, sudden death or reactions to anesthetic medications. The patient understands the risks and alternatives. All questions were answered to the patient's satisfaction and they freely signed the consent.     Anirudh Manuel MD  6/13/2022  8:24 AM
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
## 2022-06-14 LAB
ANAEROBIC CULTURE: NORMAL
GRAM STAIN RESULT: NORMAL
HIV-1 AND HIV-2 ANTIBODIES: NORMAL
WOUND/ABSCESS: NORMAL

## 2022-06-15 LAB
GRAM STAIN RESULT: NORMAL
WOUND/ABSCESS: NORMAL

## 2022-06-17 LAB
BLOOD CULTURE, ROUTINE: NORMAL
CULTURE, BLOOD 2: NORMAL

## 2022-06-26 ENCOUNTER — HOSPITAL ENCOUNTER (EMERGENCY)
Age: 32
Discharge: HOME | End: 2022-06-26
Attending: FAMILY MEDICINE
Payer: MEDICARE

## 2022-06-26 VITALS
TEMPERATURE: 97.1 F | WEIGHT: 140 LBS | SYSTOLIC BLOOD PRESSURE: 135 MMHG | HEIGHT: 61 IN | RESPIRATION RATE: 18 BRPM | DIASTOLIC BLOOD PRESSURE: 82 MMHG | BODY MASS INDEX: 26.43 KG/M2 | OXYGEN SATURATION: 100 % | HEART RATE: 82 BPM

## 2022-06-26 DIAGNOSIS — T14.8XXA OPEN WOUND: Primary | ICD-10-CM

## 2022-06-26 PROCEDURE — 99283 EMERGENCY DEPT VISIT LOW MDM: CPT

## 2022-06-26 RX ORDER — SULFAMETHOXAZOLE AND TRIMETHOPRIM 800; 160 MG/1; MG/1
1 TABLET ORAL 2 TIMES DAILY
Qty: 20 TABLET | Refills: 0 | Status: SHIPPED | OUTPATIENT
Start: 2022-06-26 | End: 2022-07-06

## 2022-06-26 RX ORDER — CEPHALEXIN 500 MG/1
500 CAPSULE ORAL 4 TIMES DAILY
Qty: 40 CAPSULE | Refills: 0 | Status: SHIPPED | OUTPATIENT
Start: 2022-06-26 | End: 2022-07-06

## 2022-06-26 ASSESSMENT — PAIN - FUNCTIONAL ASSESSMENT: PAIN_FUNCTIONAL_ASSESSMENT: NONE - DENIES PAIN

## 2022-06-26 NOTE — ED PROVIDER NOTES
HPI:  22,   Time: 2:09 PM EDT         Liz Johnson is a 32 y.o. female presenting to the ED for an open wound on the right calf beginning in about 3 weeks ago. There was an abscess in the area on the right calf that started draining spontaneously. The patient went to the emergency department due to swelling and redness. She was hospitalized with cellulitis and treated with IV antibiotics for several days. She states that there was a plan to do some sort of surgery, she is unsure what was planned but it was never accomplished and she will left the hospital on her own. Since then, the swelling and redness have improved greatly, however now since she had a large scab on the area and it fell off she now has an open wound. It is painful in the area but there is no redness or swelling. She also denies fever chills or body aches. ROS:   Pertinent positives and negatives are stated within HPI, all other systems reviewed and are negative.  --------------------------------------------- PAST HISTORY ---------------------------------------------  Past Medical History:  has no past medical history on file. Past Surgical History:  has a past surgical history that includes Mouth surgery (); pr  delivery only (N/A, 2018); and Dilation and curettage of uterus (N/A, 9/15/2021). Social History:  reports that she has been smoking cigarettes. She has a 5.00 pack-year smoking history. She has never used smokeless tobacco. She reports current drug use. Drugs: Marijuana elyn November), Cocaine, and IV. She reports that she does not drink alcohol. Family History: family history is not on file. The patients home medications have been reviewed. Allergies: Patient has no known allergies.     -------------------------------------------------- RESULTS -------------------------------------------------  All laboratory and radiology results have been personally reviewed by myself   LABS:  No results found for this visit on 06/26/22. RADIOLOGY:  Interpreted by Radiologist.  No orders to display       ------------------------- NURSING NOTES AND VITALS REVIEWED ---------------------------   The nursing notes within the ED encounter and vital signs as below have been reviewed. /82   Pulse 82   Temp 97.1 °F (36.2 °C) (Temporal)   Resp 18   Ht 5' 1\" (1.549 m)   Wt 140 lb (63.5 kg)   LMP 06/18/2022   SpO2 100%   BMI 26.45 kg/m²   Oxygen Saturation Interpretation: Normal      ---------------------------------------------------PHYSICAL EXAM--------------------------------------    Constitutional/General: Alert and oriented x3, well appearing, non toxic in NAD  Head: NC/AT  Eyes: PERRL, EOMI  Mouth: Oropharynx clear, handling secretions, no trismus  Neck: Supple, full ROM, no meningeal signs  Pulmonary: Lungs clear to auscultation bilaterally, no wheezes, rales, or rhonchi. Not in respiratory distress  Cardiovascular:  Regular rate and rhythm, no murmurs, gallops, or rubs. 2+ distal pulses  Abdomen: Soft, non tender, non distended,   Extremities: On the lateral posterior aspect of the right calf is a 3 cm diameter open wound with a depth of about 0.5 to 1 cm. There is no active bleeding. There is no surrounding erythema or tenderness. Skin: warm and dry without rash  Neurologic: GCS 15,  Psych: Normal Affect      ------------------------------ ED COURSE/MEDICAL DECISION MAKING----------------------  Medications - No data to display      Medical Decision Making:    Straightforward    She will be referred to general surgeon. She will be started on oral antibiotics. She will be demonstrated how to do wet-to-dry dressings. She will follow-up here as needed. Counseling: The emergency provider has spoken with the patient and discussed todays results, in addition to providing specific details for the plan of care and counseling regarding the diagnosis and prognosis.   Questions are answered at this time and they are agreeable with the plan.      --------------------------------- IMPRESSION AND DISPOSITION ---------------------------------    IMPRESSION  1.  Open wound        DISPOSITION  Disposition: Discharge to home  Patient condition is stable                 Terrell Garcia MD  06/26/22 6118

## 2024-06-25 NOTE — ED NOTES
I reviewed the resident/fellow's documentation and discussed the patient with the resident/fellow. I agree with the resident/fellow's medical decision making as documented in the note.   Will get repeat chemistry and mg+ as ordered by hospitalist.    Dianne Red MD       Nurse to nurse given to Sara Roper RN pt to go to 3rd floor.       Travis Borden  06/11/22 9942

## 2025-03-01 ENCOUNTER — APPOINTMENT (OUTPATIENT)
Dept: GENERAL RADIOLOGY | Age: 35
End: 2025-03-01
Payer: MEDICAID

## 2025-03-01 ENCOUNTER — HOSPITAL ENCOUNTER (EMERGENCY)
Age: 35
Discharge: HOME | End: 2025-03-02
Attending: EMERGENCY MEDICINE
Payer: MEDICAID

## 2025-03-01 DIAGNOSIS — T40.601A OPIATE OVERDOSE, ACCIDENTAL OR UNINTENTIONAL, INITIAL ENCOUNTER (HCC): Primary | ICD-10-CM

## 2025-03-01 DIAGNOSIS — F19.10 SUBSTANCE ABUSE (HCC): ICD-10-CM

## 2025-03-01 LAB
ALBUMIN SERPL-MCNC: 4.5 G/DL (ref 3.5–5.2)
ALP SERPL-CCNC: 65 U/L (ref 35–104)
ALT SERPL-CCNC: 11 U/L (ref 0–32)
AMPHET UR QL SCN: NEGATIVE
ANION GAP SERPL CALCULATED.3IONS-SCNC: 12 MMOL/L (ref 7–16)
APAP SERPL-MCNC: <5 UG/ML (ref 10–30)
AST SERPL-CCNC: 19 U/L (ref 0–31)
BARBITURATES UR QL SCN: NEGATIVE
BASOPHILS # BLD: 0.03 K/UL (ref 0–0.2)
BASOPHILS NFR BLD: 1 % (ref 0–2)
BENZODIAZ UR QL: NEGATIVE
BILIRUB SERPL-MCNC: 0.2 MG/DL (ref 0–1.2)
BUN SERPL-MCNC: 11 MG/DL (ref 6–20)
BUPRENORPHINE UR QL: NEGATIVE
CALCIUM SERPL-MCNC: 9.1 MG/DL (ref 8.6–10.2)
CANNABINOIDS UR QL SCN: NEGATIVE
CHLORIDE SERPL-SCNC: 101 MMOL/L (ref 98–107)
CK SERPL-CCNC: 71 U/L (ref 20–180)
CO2 SERPL-SCNC: 24 MMOL/L (ref 22–29)
COCAINE UR QL SCN: POSITIVE
CREAT SERPL-MCNC: 0.7 MG/DL (ref 0.5–1)
EOSINOPHIL # BLD: 0 K/UL (ref 0.05–0.5)
EOSINOPHILS RELATIVE PERCENT: 0 % (ref 0–6)
ERYTHROCYTE [DISTWIDTH] IN BLOOD BY AUTOMATED COUNT: 12.8 % (ref 11.5–15)
ETHANOLAMINE SERPL-MCNC: <10 MG/DL (ref 0–0.08)
FENTANYL UR QL: POSITIVE
GFR, ESTIMATED: >90 ML/MIN/1.73M2
GLUCOSE SERPL-MCNC: 101 MG/DL (ref 74–99)
HCG, URINE, POC: NEGATIVE
HCT VFR BLD AUTO: 39.6 % (ref 34–48)
HGB BLD-MCNC: 13.4 G/DL (ref 11.5–15.5)
IMM GRANULOCYTES # BLD AUTO: <0.03 K/UL (ref 0–0.58)
IMM GRANULOCYTES NFR BLD: 0 % (ref 0–5)
LIPASE SERPL-CCNC: 13 U/L (ref 13–60)
LYMPHOCYTES NFR BLD: 2.41 K/UL (ref 1.5–4)
LYMPHOCYTES RELATIVE PERCENT: 44 % (ref 20–42)
Lab: NORMAL
MCH RBC QN AUTO: 28.3 PG (ref 26–35)
MCHC RBC AUTO-ENTMCNC: 33.8 G/DL (ref 32–34.5)
MCV RBC AUTO: 83.5 FL (ref 80–99.9)
METHADONE UR QL: NEGATIVE
MONOCYTES NFR BLD: 0.43 K/UL (ref 0.1–0.95)
MONOCYTES NFR BLD: 8 % (ref 2–12)
NEGATIVE QC PASS/FAIL: NORMAL
NEUTROPHILS NFR BLD: 47 % (ref 43–80)
NEUTS SEG NFR BLD: 2.56 K/UL (ref 1.8–7.3)
OPIATES UR QL SCN: NEGATIVE
OXYCODONE UR QL SCN: NEGATIVE
PCP UR QL SCN: NEGATIVE
PLATELET # BLD AUTO: 233 K/UL (ref 130–450)
PMV BLD AUTO: 11.3 FL (ref 7–12)
POSITIVE QC PASS/FAIL: NORMAL
POTASSIUM SERPL-SCNC: 3.8 MMOL/L (ref 3.5–5)
PROT SERPL-MCNC: 7.4 G/DL (ref 6.4–8.3)
RBC # BLD AUTO: 4.74 M/UL (ref 3.5–5.5)
SALICYLATES SERPL-MCNC: <0.3 MG/DL (ref 0–30)
SODIUM SERPL-SCNC: 137 MMOL/L (ref 132–146)
TEST INFORMATION: ABNORMAL
TOXIC TRICYCLIC SC,BLOOD: NEGATIVE
TROPONIN I SERPL HS-MCNC: <6 NG/L (ref 0–9)
WBC OTHER # BLD: 5.4 K/UL (ref 4.5–11.5)

## 2025-03-01 PROCEDURE — 83690 ASSAY OF LIPASE: CPT

## 2025-03-01 PROCEDURE — 93005 ELECTROCARDIOGRAM TRACING: CPT | Performed by: EMERGENCY MEDICINE

## 2025-03-01 PROCEDURE — 96374 THER/PROPH/DIAG INJ IV PUSH: CPT

## 2025-03-01 PROCEDURE — 85025 COMPLETE CBC W/AUTO DIFF WBC: CPT

## 2025-03-01 PROCEDURE — 96375 TX/PRO/DX INJ NEW DRUG ADDON: CPT

## 2025-03-01 PROCEDURE — 80053 COMPREHEN METABOLIC PANEL: CPT

## 2025-03-01 PROCEDURE — 80179 DRUG ASSAY SALICYLATE: CPT

## 2025-03-01 PROCEDURE — 80143 DRUG ASSAY ACETAMINOPHEN: CPT

## 2025-03-01 PROCEDURE — 71045 X-RAY EXAM CHEST 1 VIEW: CPT

## 2025-03-01 PROCEDURE — 80307 DRUG TEST PRSMV CHEM ANLYZR: CPT

## 2025-03-01 PROCEDURE — 82550 ASSAY OF CK (CPK): CPT

## 2025-03-01 PROCEDURE — 99285 EMERGENCY DEPT VISIT HI MDM: CPT

## 2025-03-01 PROCEDURE — G0480 DRUG TEST DEF 1-7 CLASSES: HCPCS

## 2025-03-01 PROCEDURE — 84484 ASSAY OF TROPONIN QUANT: CPT

## 2025-03-01 PROCEDURE — 6360000002 HC RX W HCPCS

## 2025-03-01 PROCEDURE — 6360000002 HC RX W HCPCS: Performed by: EMERGENCY MEDICINE

## 2025-03-01 RX ORDER — ONDANSETRON 2 MG/ML
4 INJECTION INTRAMUSCULAR; INTRAVENOUS ONCE
Status: COMPLETED | OUTPATIENT
Start: 2025-03-01 | End: 2025-03-01

## 2025-03-01 RX ORDER — NALOXONE HYDROCHLORIDE 1 MG/ML
1 INJECTION INTRAMUSCULAR; INTRAVENOUS; SUBCUTANEOUS ONCE
Status: COMPLETED | OUTPATIENT
Start: 2025-03-01 | End: 2025-03-01

## 2025-03-01 RX ORDER — NALOXONE HYDROCHLORIDE 1 MG/ML
INJECTION INTRAMUSCULAR; INTRAVENOUS; SUBCUTANEOUS
Status: COMPLETED
Start: 2025-03-01 | End: 2025-03-01

## 2025-03-01 RX ADMIN — NALOXONE HYDROCHLORIDE 1 MG: 1 INJECTION PARENTERAL at 22:12

## 2025-03-01 RX ADMIN — ONDANSETRON 4 MG: 2 INJECTION, SOLUTION INTRAMUSCULAR; INTRAVENOUS at 22:19

## 2025-03-01 RX ADMIN — NALOXONE HYDROCHLORIDE 1 MG: 1 INJECTION INTRAMUSCULAR; INTRAVENOUS; SUBCUTANEOUS at 22:12

## 2025-03-01 ASSESSMENT — PAIN - FUNCTIONAL ASSESSMENT: PAIN_FUNCTIONAL_ASSESSMENT: NONE - DENIES PAIN

## 2025-03-02 VITALS
SYSTOLIC BLOOD PRESSURE: 132 MMHG | HEART RATE: 82 BPM | HEIGHT: 61 IN | DIASTOLIC BLOOD PRESSURE: 68 MMHG | BODY MASS INDEX: 26.43 KG/M2 | RESPIRATION RATE: 18 BRPM | WEIGHT: 140 LBS | TEMPERATURE: 98.4 F | OXYGEN SATURATION: 98 %

## 2025-03-02 NOTE — ED PROVIDER NOTES
from pulse ox did drop down into the 70s.  Patient was given Narcan.  Patient did have episode of emesis given Zofran.  Patient rechecked improving pulse ox stable.  Patient rechecked multiple times awake alert.  Patient was given lunch box here.  Patient rechecked a little after 1 AM.  Patient awake alert oriented vital signs stable.  Patient no distress having no pain.  Patient made aware of continuous observation here in the emergency department.  Patient rechecked a little after 2:30 AM.  Patient requesting to leave.  Patient gait is steady and normal.  Patient having no pain or discomfort.  Consultations:                 Critical Care:         This patient's ED course included: a personal history and physicial eaxmination    This patient has been closely monitored during their ED course.    Counseling:   The emergency provider has spoken with the patient and discussed today’s results, in addition to providing specific details for the plan of care and counseling regarding the diagnosis and prognosis.  Questions are answered at this time and they are agreeable with the plan.       --------------------------------- IMPRESSION AND DISPOSITION ---------------------------------    IMPRESSION  1. Opiate overdose, accidental or unintentional, initial encounter (formerly [facility])    2. Substance abuse (formerly [facility])        DISPOSITION  Disposition: Discharge  Patient condition is stable        NOTE: This report was transcribed using voice recognition software. Every effort was made to ensure accuracy; however, inadvertent computerized transcription errors may be present          Ayo Armstrong MD  03/02/25 0021       Ayo Armstrong MD  03/02/25 0146       Ayo Armstrong MD  03/02/25 0235

## 2025-03-02 NOTE — ED NOTES
Discharge instructions reviewed , including diagnosis, medications, follow up appointments, home care, and also when to call 911.  All discharge instructions questions answered.     IV removed    Pt left ED ambulatory with friend who is coming to get her.

## 2025-03-03 LAB
EKG ATRIAL RATE: 56 BPM
EKG P AXIS: 47 DEGREES
EKG P-R INTERVAL: 126 MS
EKG Q-T INTERVAL: 384 MS
EKG QRS DURATION: 62 MS
EKG QTC CALCULATION (BAZETT): 370 MS
EKG R AXIS: 97 DEGREES
EKG T AXIS: 89 DEGREES
EKG VENTRICULAR RATE: 56 BPM

## 2025-03-03 PROCEDURE — 93010 ELECTROCARDIOGRAM REPORT: CPT | Performed by: INTERNAL MEDICINE

## 2025-07-16 ENCOUNTER — HOSPITAL ENCOUNTER (EMERGENCY)
Age: 35
Discharge: HOME | End: 2025-07-17
Attending: EMERGENCY MEDICINE
Payer: MEDICAID

## 2025-07-16 DIAGNOSIS — F14.10 COCAINE ABUSE (HCC): ICD-10-CM

## 2025-07-16 DIAGNOSIS — T40.411A ACCIDENTAL FENTANYL OVERDOSE, INITIAL ENCOUNTER (HCC): Primary | ICD-10-CM

## 2025-07-16 LAB
ALBUMIN SERPL-MCNC: 4.2 G/DL (ref 3.5–5.2)
ALP SERPL-CCNC: 74 U/L (ref 35–104)
ALT SERPL-CCNC: 8 U/L (ref 0–35)
AMPHET UR QL SCN: NEGATIVE
ANION GAP SERPL CALCULATED.3IONS-SCNC: 13 MMOL/L (ref 7–16)
AST SERPL-CCNC: 18 U/L (ref 0–35)
B-HCG SERPL EIA 3RD IS-ACNC: 0.2 MIU/ML (ref 0–7)
BARBITURATES UR QL SCN: NEGATIVE
BASOPHILS # BLD: 0.03 K/UL (ref 0–0.2)
BASOPHILS NFR BLD: 1 % (ref 0–2)
BENZODIAZ UR QL: NEGATIVE
BILIRUB SERPL-MCNC: 0.3 MG/DL (ref 0–1.2)
BUN SERPL-MCNC: 7 MG/DL (ref 6–20)
BUPRENORPHINE UR QL: NEGATIVE
CALCIUM SERPL-MCNC: 9.2 MG/DL (ref 8.6–10)
CANNABINOIDS UR QL SCN: NEGATIVE
CHLORIDE SERPL-SCNC: 98 MMOL/L (ref 98–107)
CO2 SERPL-SCNC: 24 MMOL/L (ref 22–29)
COCAINE UR QL SCN: POSITIVE
CREAT SERPL-MCNC: 0.6 MG/DL (ref 0.5–1)
EOSINOPHIL # BLD: 0.11 K/UL (ref 0.05–0.5)
EOSINOPHILS RELATIVE PERCENT: 2 % (ref 0–6)
ERYTHROCYTE [DISTWIDTH] IN BLOOD BY AUTOMATED COUNT: 12.8 % (ref 11.5–15)
FENTANYL UR QL: POSITIVE
GFR, ESTIMATED: >90 ML/MIN/1.73M2
GLUCOSE SERPL-MCNC: 171 MG/DL (ref 74–99)
HCT VFR BLD AUTO: 43.7 % (ref 34–48)
HGB BLD-MCNC: 15.2 G/DL (ref 11.5–15.5)
IMM GRANULOCYTES # BLD AUTO: <0.03 K/UL (ref 0–0.58)
IMM GRANULOCYTES NFR BLD: 0 % (ref 0–5)
LACTATE BLDV-SCNC: 1.5 MMOL/L (ref 0.5–2.2)
LYMPHOCYTES NFR BLD: 2.67 K/UL (ref 1.5–4)
LYMPHOCYTES RELATIVE PERCENT: 44 % (ref 20–42)
MCH RBC QN AUTO: 28.6 PG (ref 26–35)
MCHC RBC AUTO-ENTMCNC: 34.8 G/DL (ref 32–34.5)
MCV RBC AUTO: 82.1 FL (ref 80–99.9)
METHADONE UR QL: NEGATIVE
MONOCYTES NFR BLD: 0.4 K/UL (ref 0.1–0.95)
MONOCYTES NFR BLD: 7 % (ref 2–12)
NEUTROPHILS NFR BLD: 47 % (ref 43–80)
NEUTS SEG NFR BLD: 2.91 K/UL (ref 1.8–7.3)
OPIATES UR QL SCN: NEGATIVE
OXYCODONE UR QL SCN: NEGATIVE
PCP UR QL SCN: NEGATIVE
PLATELET # BLD AUTO: 221 K/UL (ref 130–450)
PMV BLD AUTO: 11 FL (ref 7–12)
POTASSIUM SERPL-SCNC: 3.6 MMOL/L (ref 3.5–5.1)
PROT SERPL-MCNC: 7.7 G/DL (ref 6.4–8.3)
RBC # BLD AUTO: 5.32 M/UL (ref 3.5–5.5)
SODIUM SERPL-SCNC: 136 MMOL/L (ref 136–145)
TEST INFORMATION: ABNORMAL
WBC OTHER # BLD: 6.1 K/UL (ref 4.5–11.5)

## 2025-07-16 PROCEDURE — 80053 COMPREHEN METABOLIC PANEL: CPT

## 2025-07-16 PROCEDURE — 83605 ASSAY OF LACTIC ACID: CPT

## 2025-07-16 PROCEDURE — 93005 ELECTROCARDIOGRAM TRACING: CPT | Performed by: EMERGENCY MEDICINE

## 2025-07-16 PROCEDURE — 6360000002 HC RX W HCPCS: Performed by: EMERGENCY MEDICINE

## 2025-07-16 PROCEDURE — 6370000000 HC RX 637 (ALT 250 FOR IP): Performed by: EMERGENCY MEDICINE

## 2025-07-16 PROCEDURE — 2580000003 HC RX 258: Performed by: EMERGENCY MEDICINE

## 2025-07-16 PROCEDURE — 85025 COMPLETE CBC W/AUTO DIFF WBC: CPT

## 2025-07-16 PROCEDURE — 80179 DRUG ASSAY SALICYLATE: CPT

## 2025-07-16 PROCEDURE — G0480 DRUG TEST DEF 1-7 CLASSES: HCPCS

## 2025-07-16 PROCEDURE — 96374 THER/PROPH/DIAG INJ IV PUSH: CPT

## 2025-07-16 PROCEDURE — 80307 DRUG TEST PRSMV CHEM ANLYZR: CPT

## 2025-07-16 PROCEDURE — 84702 CHORIONIC GONADOTROPIN TEST: CPT

## 2025-07-16 PROCEDURE — 80143 DRUG ASSAY ACETAMINOPHEN: CPT

## 2025-07-16 PROCEDURE — 99284 EMERGENCY DEPT VISIT MOD MDM: CPT

## 2025-07-16 RX ORDER — 0.9 % SODIUM CHLORIDE 0.9 %
1000 INTRAVENOUS SOLUTION INTRAVENOUS ONCE
Status: COMPLETED | OUTPATIENT
Start: 2025-07-16 | End: 2025-07-17

## 2025-07-16 RX ORDER — ONDANSETRON 2 MG/ML
4 INJECTION INTRAMUSCULAR; INTRAVENOUS ONCE
Status: COMPLETED | OUTPATIENT
Start: 2025-07-16 | End: 2025-07-16

## 2025-07-16 RX ADMIN — SODIUM CHLORIDE 1000 ML: 0.9 INJECTION, SOLUTION INTRAVENOUS at 20:15

## 2025-07-16 RX ADMIN — NALOXONE HYDROCHLORIDE 1 SPRAY: 4 SPRAY NASAL at 20:16

## 2025-07-16 RX ADMIN — ONDANSETRON 4 MG: 2 INJECTION, SOLUTION INTRAMUSCULAR; INTRAVENOUS at 21:19

## 2025-07-16 RX ADMIN — ACTIVATED CHARCOAL 50 G: 208 SUSPENSION ORAL at 21:18

## 2025-07-16 ASSESSMENT — LIFESTYLE VARIABLES
HOW MANY STANDARD DRINKS CONTAINING ALCOHOL DO YOU HAVE ON A TYPICAL DAY: PATIENT DOES NOT DRINK
HOW OFTEN DO YOU HAVE A DRINK CONTAINING ALCOHOL: NEVER
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
## 2025-07-17 VITALS
WEIGHT: 140 LBS | HEIGHT: 61 IN | HEART RATE: 68 BPM | DIASTOLIC BLOOD PRESSURE: 69 MMHG | OXYGEN SATURATION: 99 % | BODY MASS INDEX: 26.43 KG/M2 | SYSTOLIC BLOOD PRESSURE: 117 MMHG | RESPIRATION RATE: 14 BRPM | TEMPERATURE: 98.2 F

## 2025-07-17 LAB
APAP SERPL-MCNC: <5 UG/ML (ref 10–30)
ETHANOLAMINE SERPL-MCNC: <10 MG/DL (ref 0–0.08)
SALICYLATES SERPL-MCNC: <0.5 MG/DL (ref 0–30)
TOXIC TRICYCLIC SC,BLOOD: NEGATIVE

## 2025-07-17 NOTE — ED PROVIDER NOTES
0445.  Patient remains alert and oriented. She has no current complaints.      Sushil Patel,   07/17/25 0504

## 2025-07-18 LAB
EKG ATRIAL RATE: 96 BPM
EKG P AXIS: -14 DEGREES
EKG P-R INTERVAL: 122 MS
EKG Q-T INTERVAL: 358 MS
EKG QRS DURATION: 86 MS
EKG QTC CALCULATION (BAZETT): 452 MS
EKG R AXIS: -31 DEGREES
EKG T AXIS: 62 DEGREES
EKG VENTRICULAR RATE: 96 BPM

## 2025-07-18 PROCEDURE — 93010 ELECTROCARDIOGRAM REPORT: CPT | Performed by: INTERNAL MEDICINE

## (undated) DEVICE — GOWN,SIRUS,NONRNF,SETINSLV,XL,20/CS: Brand: MEDLINE

## (undated) DEVICE — GAUZE,SPONGE,4"X4",16PLY,XRAY,STRL,LF: Brand: MEDLINE

## (undated) DEVICE — Z DISCONTINUED USE 2659142 CANNULA VAC STR 10 MM SEMI RIGID RND TIP BERK VACURET DISP

## (undated) DEVICE — GLOVE ORANGE PI 7 1/2   MSG9075

## (undated) DEVICE — SYRINGE IRRIG 60ML SFT PLIABLE BLB EZ TO GRP 1 HND USE W/

## (undated) DEVICE — LINER,SOFT,SUCTION CANISTER,1500CC: Brand: MEDLINE

## (undated) DEVICE — PAD ABD CURITY TENDERSORB 5X9IN

## (undated) DEVICE — GLOVE,SURG,SENSICARE SLT,LF,PF,6.5: Brand: MEDLINE

## (undated) DEVICE — DOUBLE BASIN SET: Brand: MEDLINE INDUSTRIES, INC.

## (undated) DEVICE — 3M™ STERI-STRIP™ ELASTIC SKIN CLOSURES, E4547, 1/2 IN X 4 IN (12 MM X 100 MM), 6 STRIPS/ENVELOPE: Brand: 3M™ STERI-STRIP™

## (undated) DEVICE — ELECTRODE PT RET AD L9FT HI MOIST COND ADH HYDRGEL CORDED

## (undated) DEVICE — MASTISOL ADHESIVE LIQ 2/3ML

## (undated) DEVICE — GLOVE,SURG,SENSICARE SLT,LF,PF,7: Brand: MEDLINE

## (undated) DEVICE — APPLICATOR MEDICATED 26 CC SOLUTION HI LT ORNG CHLORAPREP

## (undated) DEVICE — PAD MATERNITY CURITY ADH STRIP DISP

## (undated) DEVICE — NDL CNTR 40CT FM MAG: Brand: MEDLINE INDUSTRIES, INC.

## (undated) DEVICE — COVER,LIGHT HANDLE,FLX,1/PK: Brand: MEDLINE INDUSTRIES, INC.

## (undated) DEVICE — TELFA ADHESIVE ISLAND DRESSING: Brand: TELFA

## (undated) DEVICE — GOWN,SIRUS,FABRNF,XL,20/CS: Brand: MEDLINE

## (undated) DEVICE — TOWEL,OR,DSP,ST,BLUE,STD,6/PK,12PK/CS: Brand: MEDLINE

## (undated) DEVICE — SCALPEL SURG NO21 S STL STR W/ INTEGR MTRC RUL DISP

## (undated) DEVICE — APPLICATOR PREP 6ML 0.7% IOD POVACRYLEX 74% ISO ALC

## (undated) DEVICE — DRESSING SUPERABSORBENT W4XL4IN 4 LAYR NONWOVEN FLD

## (undated) DEVICE — PENCIL ES L3M BTTN SWCH HOLSTER W/ BLDE ELECTRD EDGE

## (undated) DEVICE — LIMB HOLDER, WRIST/ANKLE: Brand: DEROYAL

## (undated) DEVICE — SYRINGE MED 10ML TRNSLUC BRL PLUNG BLK MRK POLYPR CTRL

## (undated) DEVICE — PAD,NON-ADHERENT,3X8,STERILE,LF,1/PK: Brand: MEDLINE

## (undated) DEVICE — TRAY PROCED DILATATION CURETTAGE

## (undated) DEVICE — CATHETER,URETHRAL,VINYL,MALE,16",16 FR: Brand: MEDLINE

## (undated) DEVICE — SUTURE VCRL SZ 4-0 L18IN ABSRB UD L19MM PS-2 3/8 CIR PRIM J496H

## (undated) DEVICE — BLADE CLIPPER GEN PURP NS

## (undated) DEVICE — GAUZE,SPONGE,4"X4",16PLY,STRL,LF,10/TRAY: Brand: MEDLINE

## (undated) DEVICE — CHLORAPREP 26ML ORANGE

## (undated) DEVICE — TUBE BLD COLLECT ST 1 SIL COAT 7ML 10ML

## (undated) DEVICE — PACK,LAPAROTOMY,NO GOWNS: Brand: MEDLINE

## (undated) DEVICE — TUBING, SUCTION, 1/4" X 10', STRAIGHT: Brand: MEDLINE

## (undated) DEVICE — SCALPEL SURG NO10 S STL ABS PLAS HNDL DISPOSABLE

## (undated) DEVICE — PACK PROC 3IN1 W/ L12FT DIA0.25IN REINF SUCT TBNG W50XL901IN

## (undated) DEVICE — SET SURG INSTR DISSECT

## (undated) DEVICE — MARKER,SKIN,WI/RULER AND LABELS: Brand: MEDLINE

## (undated) DEVICE — SUTURE VCRL SZ 0 L36IN ABSRB VLT L36MM CT-1 1/2 CIR J346H

## (undated) DEVICE — INTENDED FOR TISSUE SEPARATION, AND OTHER PROCEDURES THAT REQUIRE A SHARP SURGICAL BLADE TO PUNCTURE OR CUT.: Brand: BARD-PARKER ® STAINLESS STEEL BLADES

## (undated) DEVICE — TRAY CATH CATH OD16FR 200ML URIN M SIL CNTR ENTRY F

## (undated) DEVICE — TRAY,VAG PREP,2PR VNYL GLV,4 C: Brand: MEDLINE INDUSTRIES, INC.

## (undated) DEVICE — CESAREAN BIRTH PACK: Brand: MEDLINE INDUSTRIES, INC.

## (undated) DEVICE — SLEEVE COMPRESS STD CALF KNEE SCD

## (undated) DEVICE — STAPLER SKIN SQ 30 ABSRB STPL DISP INSORB

## (undated) DEVICE — GLOVE ORANGE PI 8   MSG9080

## (undated) DEVICE — BLADE ES ELASTOMERIC COAT INSUL DURABLE BEND UPTO 90DEG

## (undated) DEVICE — NEEDLE HYPO 25GA L1.5IN BLU POLYPR HUB S STL REG BVL STR

## (undated) DEVICE — SMARTGOWN BREATHABLE SURGICAL GOWN: Brand: CONVERTORS